# Patient Record
Sex: FEMALE | Race: OTHER | HISPANIC OR LATINO | URBAN - METROPOLITAN AREA
[De-identification: names, ages, dates, MRNs, and addresses within clinical notes are randomized per-mention and may not be internally consistent; named-entity substitution may affect disease eponyms.]

---

## 2017-01-01 ENCOUNTER — INPATIENT (INPATIENT)
Facility: HOSPITAL | Age: 47
LOS: 0 days | DRG: 871 | End: 2017-07-30
Attending: INTERNAL MEDICINE | Admitting: INTERNAL MEDICINE
Payer: SELF-PAY

## 2017-01-01 ENCOUNTER — INPATIENT (INPATIENT)
Facility: HOSPITAL | Age: 47
LOS: 3 days | Discharge: HOME HEALTH | DRG: 178 | End: 2017-06-27
Attending: HOSPITALIST | Admitting: HOSPITALIST
Payer: MEDICAID

## 2017-01-01 VITALS
DIASTOLIC BLOOD PRESSURE: 84 MMHG | TEMPERATURE: 99 F | SYSTOLIC BLOOD PRESSURE: 116 MMHG | RESPIRATION RATE: 18 BRPM | WEIGHT: 169.98 LBS | HEART RATE: 125 BPM | OXYGEN SATURATION: 96 % | HEIGHT: 60 IN

## 2017-01-01 VITALS
DIASTOLIC BLOOD PRESSURE: 80 MMHG | SYSTOLIC BLOOD PRESSURE: 107 MMHG | OXYGEN SATURATION: 100 % | TEMPERATURE: 98 F | HEART RATE: 94 BPM

## 2017-01-01 VITALS — TEMPERATURE: 101 F | WEIGHT: 199.96 LBS | HEART RATE: 170 BPM | RESPIRATION RATE: 36 BRPM | OXYGEN SATURATION: 100 %

## 2017-01-01 VITALS — OXYGEN SATURATION: 74 % | HEART RATE: 87 BPM | RESPIRATION RATE: 27 BRPM

## 2017-01-01 DIAGNOSIS — Z87.19 PERSONAL HISTORY OF OTHER DISEASES OF THE DIGESTIVE SYSTEM: Chronic | ICD-10-CM

## 2017-01-01 DIAGNOSIS — R41.82 ALTERED MENTAL STATUS, UNSPECIFIED: ICD-10-CM

## 2017-01-01 DIAGNOSIS — Z98.89 OTHER SPECIFIED POSTPROCEDURAL STATES: Chronic | ICD-10-CM

## 2017-01-01 DIAGNOSIS — J96.90 RESPIRATORY FAILURE, UNSPECIFIED, UNSPECIFIED WHETHER WITH HYPOXIA OR HYPERCAPNIA: ICD-10-CM

## 2017-01-01 DIAGNOSIS — A41.9 SEPSIS, UNSPECIFIED ORGANISM: ICD-10-CM

## 2017-01-01 DIAGNOSIS — J96.01 ACUTE RESPIRATORY FAILURE WITH HYPOXIA: ICD-10-CM

## 2017-01-01 DIAGNOSIS — Z90.49 ACQUIRED ABSENCE OF OTHER SPECIFIED PARTS OF DIGESTIVE TRACT: Chronic | ICD-10-CM

## 2017-01-01 DIAGNOSIS — J18.9 PNEUMONIA, UNSPECIFIED ORGANISM: ICD-10-CM

## 2017-01-01 DIAGNOSIS — I82.409 ACUTE EMBOLISM AND THROMBOSIS OF UNSPECIFIED DEEP VEINS OF UNSPECIFIED LOWER EXTREMITY: ICD-10-CM

## 2017-01-01 DIAGNOSIS — N17.9 ACUTE KIDNEY FAILURE, UNSPECIFIED: ICD-10-CM

## 2017-01-01 LAB
-  CANDIDA ALBICANS: SIGNIFICANT CHANGE UP
-  CANDIDA GLABRATA: SIGNIFICANT CHANGE UP
-  CANDIDA KRUSEI: SIGNIFICANT CHANGE UP
-  CANDIDA PARAPSILOSIS: SIGNIFICANT CHANGE UP
-  CANDIDA TROPICALIS: SIGNIFICANT CHANGE UP
-  COAGULASE NEGATIVE STAPHYLOCOCCUS: SIGNIFICANT CHANGE UP
-  K. PNEUMONIAE GROUP: SIGNIFICANT CHANGE UP
-  KPC RESISTANCE GENE: SIGNIFICANT CHANGE UP
-  STREPTOCOCCUS SP. (NOT GRP A, B OR S PNEUMONIAE): SIGNIFICANT CHANGE UP
A BAUMANNII DNA SPEC QL NAA+PROBE: SIGNIFICANT CHANGE UP
ALBUMIN SERPL ELPH-MCNC: 1.9 G/DL — LOW (ref 3.3–5.2)
ALBUMIN SERPL ELPH-MCNC: 2.1 G/DL — LOW (ref 3.3–5.2)
ALBUMIN SERPL ELPH-MCNC: 2.2 G/DL — LOW (ref 3.3–5.2)
ALP SERPL-CCNC: 160 U/L — HIGH (ref 40–120)
ALP SERPL-CCNC: 276 U/L — HIGH (ref 40–120)
ALP SERPL-CCNC: 370 U/L — HIGH (ref 40–120)
ALT FLD-CCNC: 29 U/L — SIGNIFICANT CHANGE UP
ALT FLD-CCNC: 62 U/L — HIGH
ALT FLD-CCNC: 99 U/L — HIGH
ANION GAP SERPL CALC-SCNC: 15 MMOL/L — SIGNIFICANT CHANGE UP (ref 5–17)
ANION GAP SERPL CALC-SCNC: 16 MMOL/L — SIGNIFICANT CHANGE UP (ref 5–17)
ANION GAP SERPL CALC-SCNC: 17 MMOL/L — SIGNIFICANT CHANGE UP (ref 5–17)
ANION GAP SERPL CALC-SCNC: 19 MMOL/L — HIGH (ref 5–17)
ANION GAP SERPL CALC-SCNC: 26 MMOL/L — HIGH (ref 5–17)
ANION GAP SERPL CALC-SCNC: 30 MMOL/L — HIGH (ref 5–17)
ANISOCYTOSIS BLD QL: SLIGHT — SIGNIFICANT CHANGE UP
APPEARANCE UR: ABNORMAL
APPEARANCE UR: CLEAR — SIGNIFICANT CHANGE UP
APTT BLD: 29.4 SEC — SIGNIFICANT CHANGE UP (ref 27.5–37.4)
APTT BLD: 34.8 SEC — SIGNIFICANT CHANGE UP (ref 27.5–37.4)
APTT BLD: 42.5 SEC — HIGH (ref 27.5–37.4)
AST SERPL-CCNC: 189 U/L — HIGH
AST SERPL-CCNC: 388 U/L — HIGH
AST SERPL-CCNC: 60 U/L — HIGH
BACTERIA # UR AUTO: ABNORMAL
BACTERIA # UR AUTO: ABNORMAL
BASE EXCESS BLDA CALC-SCNC: -19 MMOL/L — LOW (ref -3–3)
BASOPHILS # BLD AUTO: 0 K/UL — SIGNIFICANT CHANGE UP (ref 0–0.2)
BASOPHILS # BLD AUTO: 0 K/UL — SIGNIFICANT CHANGE UP (ref 0–0.2)
BASOPHILS NFR BLD AUTO: 0 % — SIGNIFICANT CHANGE UP (ref 0–2)
BASOPHILS NFR BLD AUTO: 0 % — SIGNIFICANT CHANGE UP (ref 0–2)
BILIRUB SERPL-MCNC: 0.3 MG/DL — LOW (ref 0.4–2)
BILIRUB SERPL-MCNC: 2.1 MG/DL — HIGH (ref 0.4–2)
BILIRUB SERPL-MCNC: 2.2 MG/DL — HIGH (ref 0.4–2)
BILIRUB UR-MCNC: NEGATIVE — SIGNIFICANT CHANGE UP
BILIRUB UR-MCNC: NEGATIVE — SIGNIFICANT CHANGE UP
BLD GP AB SCN SERPL QL: SIGNIFICANT CHANGE UP
BLOOD GAS COMMENTS ARTERIAL: SIGNIFICANT CHANGE UP
BUN SERPL-MCNC: 10 MG/DL — SIGNIFICANT CHANGE UP (ref 8–20)
BUN SERPL-MCNC: 4 MG/DL — LOW (ref 8–20)
BUN SERPL-MCNC: 6 MG/DL — LOW (ref 8–20)
BUN SERPL-MCNC: 6 MG/DL — LOW (ref 8–20)
BUN SERPL-MCNC: 9 MG/DL — SIGNIFICANT CHANGE UP (ref 8–20)
BUN SERPL-MCNC: 9 MG/DL — SIGNIFICANT CHANGE UP (ref 8–20)
CALCIUM SERPL-MCNC: 6.8 MG/DL — LOW (ref 8.6–10.2)
CALCIUM SERPL-MCNC: 7 MG/DL — LOW (ref 8.6–10.2)
CALCIUM SERPL-MCNC: 7.1 MG/DL — LOW (ref 8.6–10.2)
CALCIUM SERPL-MCNC: 7.1 MG/DL — LOW (ref 8.6–10.2)
CALCIUM SERPL-MCNC: 7.3 MG/DL — LOW (ref 8.6–10.2)
CALCIUM SERPL-MCNC: 7.3 MG/DL — LOW (ref 8.6–10.2)
CHLORIDE SERPL-SCNC: 100 MMOL/L — SIGNIFICANT CHANGE UP (ref 98–107)
CHLORIDE SERPL-SCNC: 100 MMOL/L — SIGNIFICANT CHANGE UP (ref 98–107)
CHLORIDE SERPL-SCNC: 94 MMOL/L — LOW (ref 98–107)
CHLORIDE SERPL-SCNC: 95 MMOL/L — LOW (ref 98–107)
CHLORIDE SERPL-SCNC: 96 MMOL/L — LOW (ref 98–107)
CHLORIDE SERPL-SCNC: 96 MMOL/L — LOW (ref 98–107)
CK SERPL-CCNC: 16 U/L — LOW (ref 25–170)
CK SERPL-CCNC: 18 U/L — LOW (ref 25–170)
CK SERPL-CCNC: 18 U/L — LOW (ref 25–170)
CK SERPL-CCNC: 20 U/L — LOW (ref 25–170)
CO2 SERPL-SCNC: 11 MMOL/L — LOW (ref 22–29)
CO2 SERPL-SCNC: 22 MMOL/L — SIGNIFICANT CHANGE UP (ref 22–29)
CO2 SERPL-SCNC: 23 MMOL/L — SIGNIFICANT CHANGE UP (ref 22–29)
CO2 SERPL-SCNC: 24 MMOL/L — SIGNIFICANT CHANGE UP (ref 22–29)
CO2 SERPL-SCNC: 25 MMOL/L — SIGNIFICANT CHANGE UP (ref 22–29)
CO2 SERPL-SCNC: 9 MMOL/L — CRITICAL LOW (ref 22–29)
COLOR SPEC: YELLOW — SIGNIFICANT CHANGE UP
COLOR SPEC: YELLOW — SIGNIFICANT CHANGE UP
CREAT SERPL-MCNC: 0.49 MG/DL — LOW (ref 0.5–1.3)
CREAT SERPL-MCNC: 0.54 MG/DL — SIGNIFICANT CHANGE UP (ref 0.5–1.3)
CREAT SERPL-MCNC: 0.75 MG/DL — SIGNIFICANT CHANGE UP (ref 0.5–1.3)
CREAT SERPL-MCNC: 1.05 MG/DL — SIGNIFICANT CHANGE UP (ref 0.5–1.3)
CREAT SERPL-MCNC: 1.42 MG/DL — HIGH (ref 0.5–1.3)
CREAT SERPL-MCNC: 1.54 MG/DL — HIGH (ref 0.5–1.3)
CULTURE RESULTS: NO GROWTH — SIGNIFICANT CHANGE UP
CULTURE RESULTS: SIGNIFICANT CHANGE UP
CULTURE RESULTS: SIGNIFICANT CHANGE UP
DACRYOCYTES BLD QL SMEAR: SLIGHT — SIGNIFICANT CHANGE UP
DIFF PNL FLD: ABNORMAL
DIFF PNL FLD: ABNORMAL
E CLOAC COMP DNA BLD POS QL NAA+PROBE: SIGNIFICANT CHANGE UP
E COLI DNA BLD POS QL NAA+NON-PROBE: SIGNIFICANT CHANGE UP
ELLIPTOCYTES BLD QL SMEAR: SLIGHT — SIGNIFICANT CHANGE UP
ENTEROCOC DNA BLD POS QL NAA+NON-PROBE: SIGNIFICANT CHANGE UP
ENTEROCOC DNA BLD POS QL NAA+NON-PROBE: SIGNIFICANT CHANGE UP
EOSINOPHIL # BLD AUTO: 0 K/UL — SIGNIFICANT CHANGE UP (ref 0–0.5)
EOSINOPHIL NFR BLD AUTO: 0 % — SIGNIFICANT CHANGE UP (ref 0–6)
EOSINOPHIL NFR BLD AUTO: 1 % — SIGNIFICANT CHANGE UP (ref 0–5)
EPI CELLS # UR: SIGNIFICANT CHANGE UP
EPI CELLS # UR: SIGNIFICANT CHANGE UP
FERRITIN SERPL-MCNC: 1465 NG/ML — HIGH (ref 11–306)
GAS PNL BLDA: SIGNIFICANT CHANGE UP
GLUCOSE SERPL-MCNC: 114 MG/DL — SIGNIFICANT CHANGE UP (ref 70–115)
GLUCOSE SERPL-MCNC: 177 MG/DL — HIGH (ref 70–115)
GLUCOSE SERPL-MCNC: 61 MG/DL — LOW (ref 70–115)
GLUCOSE SERPL-MCNC: 90 MG/DL — SIGNIFICANT CHANGE UP (ref 70–115)
GLUCOSE SERPL-MCNC: 93 MG/DL — SIGNIFICANT CHANGE UP (ref 70–115)
GLUCOSE SERPL-MCNC: 94 MG/DL — SIGNIFICANT CHANGE UP (ref 70–115)
GLUCOSE UR QL: NEGATIVE MG/DL — SIGNIFICANT CHANGE UP
GLUCOSE UR QL: NEGATIVE MG/DL — SIGNIFICANT CHANGE UP
GP B STREP DNA BLD POS QL NAA+NON-PROBE: SIGNIFICANT CHANGE UP
HAEM INFLU DNA BLD POS QL NAA+NON-PROBE: SIGNIFICANT CHANGE UP
HCO3 BLDA-SCNC: 10 MMOL/L — LOW (ref 20–26)
HCT VFR BLD CALC: 23.6 % — LOW (ref 37–47)
HCT VFR BLD CALC: 24 % — LOW (ref 37–47)
HCT VFR BLD CALC: 25.4 % — LOW (ref 37–47)
HCT VFR BLD CALC: 25.4 % — LOW (ref 37–47)
HCT VFR BLD CALC: 26.9 % — LOW (ref 37–47)
HCT VFR BLD CALC: 27.6 % — LOW (ref 37–47)
HCT VFR BLD CALC: 31.8 % — LOW (ref 37–47)
HGB BLD-MCNC: 10.3 G/DL — LOW (ref 12–16)
HGB BLD-MCNC: 7.4 G/DL — LOW (ref 12–16)
HGB BLD-MCNC: 7.5 G/DL — LOW (ref 12–16)
HGB BLD-MCNC: 8 G/DL — LOW (ref 12–16)
HGB BLD-MCNC: 8.2 G/DL — LOW (ref 12–16)
HGB BLD-MCNC: 8.4 G/DL — LOW (ref 12–16)
HGB BLD-MCNC: 8.8 G/DL — LOW (ref 12–16)
HYPOCHROMIA BLD QL: SLIGHT — SIGNIFICANT CHANGE UP
INR BLD: 1.5 RATIO — HIGH (ref 0.88–1.16)
INR BLD: 2.2 RATIO — HIGH (ref 0.88–1.16)
INR BLD: 2.54 RATIO — HIGH (ref 0.88–1.16)
IRON SATN MFR SERPL: 42 % — SIGNIFICANT CHANGE UP (ref 14–50)
IRON SATN MFR SERPL: 52 UG/DL — SIGNIFICANT CHANGE UP (ref 37–145)
K OXYTOCA DNA BLD POS QL NAA+NON-PROBE: SIGNIFICANT CHANGE UP
KETONES UR-MCNC: NEGATIVE — SIGNIFICANT CHANGE UP
KETONES UR-MCNC: NEGATIVE — SIGNIFICANT CHANGE UP
L MONOCYTOG DNA BLD POS QL NAA+NON-PROBE: SIGNIFICANT CHANGE UP
LACTATE BLDV-MCNC: 11.4 MMOL/L — CRITICAL HIGH (ref 0.5–2)
LACTATE BLDV-MCNC: 12.8 MMOL/L — CRITICAL HIGH (ref 0.5–2)
LACTATE SERPL-SCNC: 1.3 MMOL/L — SIGNIFICANT CHANGE UP (ref 0.5–2)
LEUKOCYTE ESTERASE UR-ACNC: ABNORMAL
LEUKOCYTE ESTERASE UR-ACNC: ABNORMAL
LIDOCAIN IGE QN: 17 U/L — LOW (ref 22–51)
LYMPHOCYTES # BLD AUTO: 0.9 K/UL — LOW (ref 1–4.8)
LYMPHOCYTES # BLD AUTO: 1 % — LOW (ref 20–55)
LYMPHOCYTES # BLD AUTO: 11 % — LOW (ref 20–55)
MACROCYTES BLD QL: SLIGHT — SIGNIFICANT CHANGE UP
MAGNESIUM SERPL-MCNC: 1 MG/DL — CRITICAL LOW (ref 1.6–2.6)
MAGNESIUM SERPL-MCNC: 1.4 MG/DL — LOW (ref 1.6–2.6)
MAGNESIUM SERPL-MCNC: 1.8 MG/DL — SIGNIFICANT CHANGE UP (ref 1.6–2.6)
MAGNESIUM SERPL-MCNC: 1.9 MG/DL — SIGNIFICANT CHANGE UP (ref 1.6–2.6)
MCHC RBC-ENTMCNC: 27.6 PG — SIGNIFICANT CHANGE UP (ref 27–31)
MCHC RBC-ENTMCNC: 27.9 PG — SIGNIFICANT CHANGE UP (ref 27–31)
MCHC RBC-ENTMCNC: 28.1 PG — SIGNIFICANT CHANGE UP (ref 27–31)
MCHC RBC-ENTMCNC: 28.2 PG — SIGNIFICANT CHANGE UP (ref 27–31)
MCHC RBC-ENTMCNC: 28.3 PG — SIGNIFICANT CHANGE UP (ref 27–31)
MCHC RBC-ENTMCNC: 28.4 PG — SIGNIFICANT CHANGE UP (ref 27–31)
MCHC RBC-ENTMCNC: 28.7 PG — SIGNIFICANT CHANGE UP (ref 27–31)
MCHC RBC-ENTMCNC: 31.2 G/DL — LOW (ref 32–36)
MCHC RBC-ENTMCNC: 31.3 G/DL — LOW (ref 32–36)
MCHC RBC-ENTMCNC: 31.4 G/DL — LOW (ref 32–36)
MCHC RBC-ENTMCNC: 31.5 G/DL — LOW (ref 32–36)
MCHC RBC-ENTMCNC: 31.9 G/DL — LOW (ref 32–36)
MCHC RBC-ENTMCNC: 32.3 G/DL — SIGNIFICANT CHANGE UP (ref 32–36)
MCHC RBC-ENTMCNC: 32.4 G/DL — SIGNIFICANT CHANGE UP (ref 32–36)
MCV RBC AUTO: 87.6 FL — SIGNIFICANT CHANGE UP (ref 81–99)
MCV RBC AUTO: 88.2 FL — SIGNIFICANT CHANGE UP (ref 81–99)
MCV RBC AUTO: 88.5 FL — SIGNIFICANT CHANGE UP (ref 81–99)
MCV RBC AUTO: 88.6 FL — SIGNIFICANT CHANGE UP (ref 81–99)
MCV RBC AUTO: 89 FL — SIGNIFICANT CHANGE UP (ref 81–99)
MCV RBC AUTO: 89.7 FL — SIGNIFICANT CHANGE UP (ref 81–99)
MCV RBC AUTO: 90.3 FL — SIGNIFICANT CHANGE UP (ref 81–99)
METAMYELOCYTES # FLD: 1 % — HIGH (ref 0–0)
METAMYELOCYTES # FLD: 1 % — HIGH (ref 0–0)
METAMYELOCYTES # FLD: 2 % — HIGH (ref 0–0)
METHOD TYPE: SIGNIFICANT CHANGE UP
MICROCYTES BLD QL: SLIGHT — SIGNIFICANT CHANGE UP
MONOCYTES # BLD AUTO: 0.7 K/UL — SIGNIFICANT CHANGE UP (ref 0–0.8)
MONOCYTES NFR BLD AUTO: 1 % — LOW (ref 3–10)
MONOCYTES NFR BLD AUTO: 14 % — HIGH (ref 3–10)
MONOCYTES NFR BLD AUTO: 15 % — HIGH (ref 3–10)
MONOCYTES NFR BLD AUTO: 4 % — SIGNIFICANT CHANGE UP (ref 3–10)
MONOCYTES NFR BLD AUTO: 6 % — SIGNIFICANT CHANGE UP (ref 3–10)
MRSA SPEC QL CULT: SIGNIFICANT CHANGE UP
MSSA DNA SPEC QL NAA+PROBE: SIGNIFICANT CHANGE UP
MYELOCYTES NFR BLD: 1 % — HIGH (ref 0–0)
N MEN ISLT CULT: SIGNIFICANT CHANGE UP
NEUTROPHILS # BLD AUTO: 7.6 K/UL — SIGNIFICANT CHANGE UP (ref 1.8–8)
NEUTROPHILS NFR BLD AUTO: 73 % — SIGNIFICANT CHANGE UP (ref 37–73)
NEUTROPHILS NFR BLD AUTO: 74 % — HIGH (ref 37–73)
NEUTROPHILS NFR BLD AUTO: 74 % — HIGH (ref 37–73)
NEUTROPHILS NFR BLD AUTO: 81 % — HIGH (ref 37–73)
NEUTROPHILS NFR BLD AUTO: 83 % — HIGH (ref 37–73)
NEUTS BAND # BLD: 10 % — HIGH (ref 0–8)
NEUTS BAND # BLD: 5 % — SIGNIFICANT CHANGE UP (ref 0–8)
NEUTS BAND # BLD: 9 % — HIGH (ref 0–8)
NITRITE UR-MCNC: NEGATIVE — SIGNIFICANT CHANGE UP
NITRITE UR-MCNC: POSITIVE
NRBC # BLD: 1 /100 — HIGH (ref 0–0)
NT-PROBNP SERPL-SCNC: 175 PG/ML — SIGNIFICANT CHANGE UP (ref 0–300)
OVALOCYTES BLD QL SMEAR: SLIGHT — SIGNIFICANT CHANGE UP
OVALOCYTES BLD QL SMEAR: SLIGHT — SIGNIFICANT CHANGE UP
P AERUGINOSA DNA BLD POS NAA+NON-PROBE: SIGNIFICANT CHANGE UP
PCO2 BLDA: 27 MMHG — LOW (ref 35–45)
PH BLDA: 7.14 — CRITICAL LOW (ref 7.35–7.45)
PH UR: 7 — SIGNIFICANT CHANGE UP (ref 5–8)
PH UR: 8 — SIGNIFICANT CHANGE UP (ref 5–8)
PHOSPHATE SERPL-MCNC: 4.5 MG/DL — SIGNIFICANT CHANGE UP (ref 2.4–4.7)
PHOSPHATE SERPL-MCNC: 5.1 MG/DL — HIGH (ref 2.4–4.7)
PLAT MORPH BLD: NORMAL — SIGNIFICANT CHANGE UP
PLATELET # BLD AUTO: 199 K/UL — SIGNIFICANT CHANGE UP (ref 150–400)
PLATELET # BLD AUTO: 248 K/UL — SIGNIFICANT CHANGE UP (ref 150–400)
PLATELET # BLD AUTO: 338 K/UL — SIGNIFICANT CHANGE UP (ref 150–400)
PLATELET # BLD AUTO: 416 K/UL — HIGH (ref 150–400)
PLATELET # BLD AUTO: 418 K/UL — HIGH (ref 150–400)
PLATELET # BLD AUTO: 432 K/UL — HIGH (ref 150–400)
PLATELET # BLD AUTO: 433 K/UL — HIGH (ref 150–400)
PO2 BLDA: 144 MMHG — HIGH (ref 83–108)
POIKILOCYTOSIS BLD QL AUTO: SLIGHT — SIGNIFICANT CHANGE UP
POLYCHROMASIA BLD QL SMEAR: SLIGHT — SIGNIFICANT CHANGE UP
POTASSIUM SERPL-MCNC: 2.8 MMOL/L — CRITICAL LOW (ref 3.5–5.3)
POTASSIUM SERPL-MCNC: 2.8 MMOL/L — CRITICAL LOW (ref 3.5–5.3)
POTASSIUM SERPL-MCNC: 3.3 MMOL/L — LOW (ref 3.5–5.3)
POTASSIUM SERPL-MCNC: 3.5 MMOL/L — SIGNIFICANT CHANGE UP (ref 3.5–5.3)
POTASSIUM SERPL-MCNC: 3.6 MMOL/L — SIGNIFICANT CHANGE UP (ref 3.5–5.3)
POTASSIUM SERPL-MCNC: 3.8 MMOL/L — SIGNIFICANT CHANGE UP (ref 3.5–5.3)
POTASSIUM SERPL-MCNC: 4.2 MMOL/L — SIGNIFICANT CHANGE UP (ref 3.5–5.3)
POTASSIUM SERPL-SCNC: 2.8 MMOL/L — CRITICAL LOW (ref 3.5–5.3)
POTASSIUM SERPL-SCNC: 2.8 MMOL/L — CRITICAL LOW (ref 3.5–5.3)
POTASSIUM SERPL-SCNC: 3.3 MMOL/L — LOW (ref 3.5–5.3)
POTASSIUM SERPL-SCNC: 3.5 MMOL/L — SIGNIFICANT CHANGE UP (ref 3.5–5.3)
POTASSIUM SERPL-SCNC: 3.6 MMOL/L — SIGNIFICANT CHANGE UP (ref 3.5–5.3)
POTASSIUM SERPL-SCNC: 3.8 MMOL/L — SIGNIFICANT CHANGE UP (ref 3.5–5.3)
POTASSIUM SERPL-SCNC: 4.2 MMOL/L — SIGNIFICANT CHANGE UP (ref 3.5–5.3)
PROT SERPL-MCNC: 5.4 G/DL — LOW (ref 6.6–8.7)
PROT SERPL-MCNC: 5.7 G/DL — LOW (ref 6.6–8.7)
PROT SERPL-MCNC: 6.4 G/DL — LOW (ref 6.6–8.7)
PROT UR-MCNC: 15 MG/DL
PROT UR-MCNC: 500 MG/DL
PROTEUS SP DNA BLD POS QL NAA+NON-PROBE: SIGNIFICANT CHANGE UP
PROTHROM AB SERPL-ACNC: 16.6 SEC — HIGH (ref 9.8–12.7)
PROTHROM AB SERPL-ACNC: 24.6 SEC — HIGH (ref 9.8–12.7)
PROTHROM AB SERPL-ACNC: 28.5 SEC — HIGH (ref 9.8–12.7)
RBC # BLD: 2.63 M/UL — LOW (ref 4.4–5.2)
RBC # BLD: 2.72 M/UL — LOW (ref 4.4–5.2)
RBC # BLD: 2.72 M/UL — LOW (ref 4.4–5.2)
RBC # BLD: 2.87 M/UL — LOW (ref 4.4–5.2)
RBC # BLD: 2.9 M/UL — LOW (ref 4.4–5.2)
RBC # BLD: 2.98 M/UL — LOW (ref 4.4–5.2)
RBC # BLD: 3.1 M/UL — LOW (ref 4.4–5.2)
RBC # BLD: 3.59 M/UL — LOW (ref 4.4–5.2)
RBC # FLD: 18.7 % — HIGH (ref 11–15.6)
RBC # FLD: 19.1 % — HIGH (ref 11–15.6)
RBC # FLD: 19.1 % — HIGH (ref 11–15.6)
RBC # FLD: 19.7 % — HIGH (ref 11–15.6)
RBC # FLD: 20.5 % — HIGH (ref 11–15.6)
RBC # FLD: 20.6 % — HIGH (ref 11–15.6)
RBC # FLD: 21.1 % — HIGH (ref 11–15.6)
RBC BLD AUTO: ABNORMAL
RBC BLD AUTO: PRESENT — SIGNIFICANT CHANGE UP
RBC CASTS # UR COMP ASSIST: >50 /HPF (ref 0–4)
RBC CASTS # UR COMP ASSIST: ABNORMAL /HPF (ref 0–4)
RETICS #: 65 K/UL — SIGNIFICANT CHANGE UP (ref 25–125)
RETICS/RBC NFR: 2.4 % — SIGNIFICANT CHANGE UP (ref 0.5–2.6)
S MARCESCENS DNA BLD POS NAA+NON-PROBE: SIGNIFICANT CHANGE UP
S PNEUM DNA BLD POS QL NAA+NON-PROBE: SIGNIFICANT CHANGE UP
S PYO DNA BLD POS QL NAA+NON-PROBE: SIGNIFICANT CHANGE UP
SAO2 % BLDA: 98 % — SIGNIFICANT CHANGE UP (ref 95–99)
SODIUM SERPL-SCNC: 133 MMOL/L — LOW (ref 135–145)
SODIUM SERPL-SCNC: 135 MMOL/L — SIGNIFICANT CHANGE UP (ref 135–145)
SODIUM SERPL-SCNC: 135 MMOL/L — SIGNIFICANT CHANGE UP (ref 135–145)
SODIUM SERPL-SCNC: 137 MMOL/L — SIGNIFICANT CHANGE UP (ref 135–145)
SODIUM SERPL-SCNC: 137 MMOL/L — SIGNIFICANT CHANGE UP (ref 135–145)
SODIUM SERPL-SCNC: 141 MMOL/L — SIGNIFICANT CHANGE UP (ref 135–145)
SP GR SPEC: 1.01 — SIGNIFICANT CHANGE UP (ref 1.01–1.02)
SP GR SPEC: 1.01 — SIGNIFICANT CHANGE UP (ref 1.01–1.02)
SPECIMEN SOURCE: SIGNIFICANT CHANGE UP
TARGETS BLD QL SMEAR: SLIGHT — SIGNIFICANT CHANGE UP
TIBC SERPL-MCNC: 124 UG/DL — LOW (ref 220–430)
TRANSFERRIN SERPL-MCNC: 87 MG/DL — LOW (ref 192–382)
TROPONIN T SERPL-MCNC: 0.01 NG/ML — SIGNIFICANT CHANGE UP (ref 0–0.06)
TROPONIN T SERPL-MCNC: 0.02 NG/ML — SIGNIFICANT CHANGE UP (ref 0–0.06)
TROPONIN T SERPL-MCNC: <0.01 NG/ML — SIGNIFICANT CHANGE UP (ref 0–0.06)
TROPONIN T SERPL-MCNC: <0.01 NG/ML — SIGNIFICANT CHANGE UP (ref 0–0.06)
TYPE + AB SCN PNL BLD: SIGNIFICANT CHANGE UP
UROBILINOGEN FLD QL: 1 MG/DL
UROBILINOGEN FLD QL: NEGATIVE MG/DL — SIGNIFICANT CHANGE UP
VANCOMYCIN TROUGH SERPL-MCNC: 19 UG/ML — SIGNIFICANT CHANGE UP (ref 10–20)
VANCOMYCIN TROUGH SERPL-MCNC: 27 UG/ML — CRITICAL HIGH (ref 10–20)
VANCOMYCIN TROUGH SERPL-MCNC: 30 UG/ML — CRITICAL HIGH (ref 10–20)
VANCOMYCIN TROUGH SERPL-MCNC: 47.6 UG/ML — CRITICAL HIGH (ref 10–20)
WBC # BLD: 40.7 K/UL — CRITICAL HIGH (ref 4.8–10.8)
WBC # BLD: 6.2 K/UL — SIGNIFICANT CHANGE UP (ref 4.8–10.8)
WBC # BLD: 6.3 K/UL — SIGNIFICANT CHANGE UP (ref 4.8–10.8)
WBC # BLD: 8.5 K/UL — SIGNIFICANT CHANGE UP (ref 4.8–10.8)
WBC # BLD: 8.6 K/UL — SIGNIFICANT CHANGE UP (ref 4.8–10.8)
WBC # BLD: 9.1 K/UL — SIGNIFICANT CHANGE UP (ref 4.8–10.8)
WBC # BLD: 9.8 K/UL — SIGNIFICANT CHANGE UP (ref 4.8–10.8)
WBC # FLD AUTO: 40.7 K/UL — CRITICAL HIGH (ref 4.8–10.8)
WBC # FLD AUTO: 6.2 K/UL — SIGNIFICANT CHANGE UP (ref 4.8–10.8)
WBC # FLD AUTO: 6.3 K/UL — SIGNIFICANT CHANGE UP (ref 4.8–10.8)
WBC # FLD AUTO: 8.5 K/UL — SIGNIFICANT CHANGE UP (ref 4.8–10.8)
WBC # FLD AUTO: 8.6 K/UL — SIGNIFICANT CHANGE UP (ref 4.8–10.8)
WBC # FLD AUTO: 9.1 K/UL — SIGNIFICANT CHANGE UP (ref 4.8–10.8)
WBC # FLD AUTO: 9.8 K/UL — SIGNIFICANT CHANGE UP (ref 4.8–10.8)
WBC UR QL: ABNORMAL
WBC UR QL: SIGNIFICANT CHANGE UP

## 2017-01-01 PROCEDURE — T1013: CPT

## 2017-01-01 PROCEDURE — 87040 BLOOD CULTURE FOR BACTERIA: CPT

## 2017-01-01 PROCEDURE — 99285 EMERGENCY DEPT VISIT HI MDM: CPT

## 2017-01-01 PROCEDURE — 93970 EXTREMITY STUDY: CPT

## 2017-01-01 PROCEDURE — 85610 PROTHROMBIN TIME: CPT

## 2017-01-01 PROCEDURE — 70450 CT HEAD/BRAIN W/O DYE: CPT

## 2017-01-01 PROCEDURE — 87086 URINE CULTURE/COLONY COUNT: CPT

## 2017-01-01 PROCEDURE — 85045 AUTOMATED RETICULOCYTE COUNT: CPT

## 2017-01-01 PROCEDURE — 96375 TX/PRO/DX INJ NEW DRUG ADDON: CPT

## 2017-01-01 PROCEDURE — 99233 SBSQ HOSP IP/OBS HIGH 50: CPT

## 2017-01-01 PROCEDURE — 99291 CRITICAL CARE FIRST HOUR: CPT | Mod: 25

## 2017-01-01 PROCEDURE — 82728 ASSAY OF FERRITIN: CPT

## 2017-01-01 PROCEDURE — 80053 COMPREHEN METABOLIC PANEL: CPT

## 2017-01-01 PROCEDURE — 82550 ASSAY OF CK (CPK): CPT

## 2017-01-01 PROCEDURE — 96374 THER/PROPH/DIAG INJ IV PUSH: CPT

## 2017-01-01 PROCEDURE — 80202 ASSAY OF VANCOMYCIN: CPT

## 2017-01-01 PROCEDURE — 87186 SC STD MICRODIL/AGAR DIL: CPT

## 2017-01-01 PROCEDURE — 36600 WITHDRAWAL OF ARTERIAL BLOOD: CPT

## 2017-01-01 PROCEDURE — 93010 ELECTROCARDIOGRAM REPORT: CPT

## 2017-01-01 PROCEDURE — 87150 DNA/RNA AMPLIFIED PROBE: CPT

## 2017-01-01 PROCEDURE — 93005 ELECTROCARDIOGRAM TRACING: CPT

## 2017-01-01 PROCEDURE — 83605 ASSAY OF LACTIC ACID: CPT

## 2017-01-01 PROCEDURE — 36415 COLL VENOUS BLD VENIPUNCTURE: CPT

## 2017-01-01 PROCEDURE — 93970 EXTREMITY STUDY: CPT | Mod: 26

## 2017-01-01 PROCEDURE — 94640 AIRWAY INHALATION TREATMENT: CPT

## 2017-01-01 PROCEDURE — 84132 ASSAY OF SERUM POTASSIUM: CPT

## 2017-01-01 PROCEDURE — 86900 BLOOD TYPING SEROLOGIC ABO: CPT

## 2017-01-01 PROCEDURE — 99239 HOSP IP/OBS DSCHRG MGMT >30: CPT

## 2017-01-01 PROCEDURE — 82330 ASSAY OF CALCIUM: CPT

## 2017-01-01 PROCEDURE — 85730 THROMBOPLASTIN TIME PARTIAL: CPT

## 2017-01-01 PROCEDURE — 94760 N-INVAS EAR/PLS OXIMETRY 1: CPT

## 2017-01-01 PROCEDURE — 80048 BASIC METABOLIC PNL TOTAL CA: CPT

## 2017-01-01 PROCEDURE — 84484 ASSAY OF TROPONIN QUANT: CPT

## 2017-01-01 PROCEDURE — 71045 X-RAY EXAM CHEST 1 VIEW: CPT

## 2017-01-01 PROCEDURE — 83880 ASSAY OF NATRIURETIC PEPTIDE: CPT

## 2017-01-01 PROCEDURE — 71250 CT THORAX DX C-: CPT | Mod: 26

## 2017-01-01 PROCEDURE — 74176 CT ABD & PELVIS W/O CONTRAST: CPT

## 2017-01-01 PROCEDURE — 94002 VENT MGMT INPAT INIT DAY: CPT

## 2017-01-01 PROCEDURE — 84702 CHORIONIC GONADOTROPIN TEST: CPT

## 2017-01-01 PROCEDURE — 99231 SBSQ HOSP IP/OBS SF/LOW 25: CPT

## 2017-01-01 PROCEDURE — 97163 PT EVAL HIGH COMPLEX 45 MIN: CPT

## 2017-01-01 PROCEDURE — 71250 CT THORAX DX C-: CPT

## 2017-01-01 PROCEDURE — 83690 ASSAY OF LIPASE: CPT

## 2017-01-01 PROCEDURE — 85027 COMPLETE CBC AUTOMATED: CPT

## 2017-01-01 PROCEDURE — 84466 ASSAY OF TRANSFERRIN: CPT

## 2017-01-01 PROCEDURE — 86850 RBC ANTIBODY SCREEN: CPT

## 2017-01-01 PROCEDURE — 83735 ASSAY OF MAGNESIUM: CPT

## 2017-01-01 PROCEDURE — 74176 CT ABD & PELVIS W/O CONTRAST: CPT | Mod: 26

## 2017-01-01 PROCEDURE — 93306 TTE W/DOPPLER COMPLETE: CPT | Mod: 26

## 2017-01-01 PROCEDURE — 81001 URINALYSIS AUTO W/SCOPE: CPT

## 2017-01-01 PROCEDURE — 71010: CPT | Mod: 26

## 2017-01-01 PROCEDURE — 82947 ASSAY GLUCOSE BLOOD QUANT: CPT

## 2017-01-01 PROCEDURE — 86901 BLOOD TYPING SEROLOGIC RH(D): CPT

## 2017-01-01 PROCEDURE — 99292 CRITICAL CARE ADDL 30 MIN: CPT

## 2017-01-01 PROCEDURE — 99291 CRITICAL CARE FIRST HOUR: CPT

## 2017-01-01 PROCEDURE — 85014 HEMATOCRIT: CPT

## 2017-01-01 PROCEDURE — 82803 BLOOD GASES ANY COMBINATION: CPT

## 2017-01-01 PROCEDURE — 99285 EMERGENCY DEPT VISIT HI MDM: CPT | Mod: 25

## 2017-01-01 PROCEDURE — 94003 VENT MGMT INPAT SUBQ DAY: CPT

## 2017-01-01 PROCEDURE — 84100 ASSAY OF PHOSPHORUS: CPT

## 2017-01-01 PROCEDURE — 82435 ASSAY OF BLOOD CHLORIDE: CPT

## 2017-01-01 PROCEDURE — 70450 CT HEAD/BRAIN W/O DYE: CPT | Mod: 26

## 2017-01-01 PROCEDURE — 83550 IRON BINDING TEST: CPT

## 2017-01-01 PROCEDURE — 99223 1ST HOSP IP/OBS HIGH 75: CPT

## 2017-01-01 PROCEDURE — 93306 TTE W/DOPPLER COMPLETE: CPT

## 2017-01-01 PROCEDURE — 84295 ASSAY OF SERUM SODIUM: CPT

## 2017-01-01 RX ORDER — OMEPRAZOLE 10 MG/1
1 CAPSULE, DELAYED RELEASE ORAL
Qty: 0 | Refills: 0 | COMMUNITY

## 2017-01-01 RX ORDER — DEXTROSE 50 % IN WATER 50 %
12.5 SYRINGE (ML) INTRAVENOUS ONCE
Qty: 0 | Refills: 0 | Status: COMPLETED | OUTPATIENT
Start: 2017-01-01 | End: 2017-01-01

## 2017-01-01 RX ORDER — MAGNESIUM SULFATE 500 MG/ML
2 VIAL (ML) INJECTION ONCE
Qty: 0 | Refills: 0 | Status: COMPLETED | OUTPATIENT
Start: 2017-01-01 | End: 2017-01-01

## 2017-01-01 RX ORDER — ALBUTEROL 90 UG/1
1 AEROSOL, METERED ORAL EVERY 4 HOURS
Qty: 0 | Refills: 0 | Status: DISCONTINUED | OUTPATIENT
Start: 2017-01-01 | End: 2017-01-01

## 2017-01-01 RX ORDER — CHLORHEXIDINE GLUCONATE 213 G/1000ML
15 SOLUTION TOPICAL
Qty: 0 | Refills: 0 | Status: DISCONTINUED | OUTPATIENT
Start: 2017-01-01 | End: 2017-01-01

## 2017-01-01 RX ORDER — ACETAMINOPHEN 500 MG
650 TABLET ORAL EVERY 6 HOURS
Qty: 0 | Refills: 0 | Status: DISCONTINUED | OUTPATIENT
Start: 2017-01-01 | End: 2017-01-01

## 2017-01-01 RX ORDER — ONDANSETRON 8 MG/1
4 TABLET, FILM COATED ORAL EVERY 6 HOURS
Qty: 0 | Refills: 0 | Status: DISCONTINUED | OUTPATIENT
Start: 2017-01-01 | End: 2017-01-01

## 2017-01-01 RX ORDER — DEXTROSE 50 % IN WATER 50 %
1 SYRINGE (ML) INTRAVENOUS ONCE
Qty: 0 | Refills: 0 | Status: DISCONTINUED | OUTPATIENT
Start: 2017-01-01 | End: 2017-01-01

## 2017-01-01 RX ORDER — PIPERACILLIN AND TAZOBACTAM 4; .5 G/20ML; G/20ML
3.38 INJECTION, POWDER, LYOPHILIZED, FOR SOLUTION INTRAVENOUS EVERY 8 HOURS
Qty: 0 | Refills: 0 | Status: DISCONTINUED | OUTPATIENT
Start: 2017-01-01 | End: 2017-01-01

## 2017-01-01 RX ORDER — PANTOPRAZOLE SODIUM 20 MG/1
40 TABLET, DELAYED RELEASE ORAL DAILY
Qty: 0 | Refills: 0 | Status: DISCONTINUED | OUTPATIENT
Start: 2017-01-01 | End: 2017-01-01

## 2017-01-01 RX ORDER — TIOTROPIUM BROMIDE 18 UG/1
1 CAPSULE ORAL; RESPIRATORY (INHALATION) DAILY
Qty: 0 | Refills: 0 | Status: DISCONTINUED | OUTPATIENT
Start: 2017-01-01 | End: 2017-01-01

## 2017-01-01 RX ORDER — SODIUM CHLORIDE 9 MG/ML
1000 INJECTION, SOLUTION INTRAVENOUS
Qty: 0 | Refills: 0 | Status: DISCONTINUED | OUTPATIENT
Start: 2017-01-01 | End: 2017-01-01

## 2017-01-01 RX ORDER — NOREPINEPHRINE BITARTRATE/D5W 8 MG/250ML
0.5 PLASTIC BAG, INJECTION (ML) INTRAVENOUS
Qty: 16 | Refills: 0 | Status: DISCONTINUED | OUTPATIENT
Start: 2017-01-01 | End: 2017-01-01

## 2017-01-01 RX ORDER — GABAPENTIN 400 MG/1
1 CAPSULE ORAL
Qty: 0 | Refills: 0 | COMMUNITY

## 2017-01-01 RX ORDER — DEXTROSE 50 % IN WATER 50 %
25 SYRINGE (ML) INTRAVENOUS ONCE
Qty: 0 | Refills: 0 | Status: DISCONTINUED | OUTPATIENT
Start: 2017-01-01 | End: 2017-01-01

## 2017-01-01 RX ORDER — IPRATROPIUM/ALBUTEROL SULFATE 18-103MCG
3 AEROSOL WITH ADAPTER (GRAM) INHALATION EVERY 6 HOURS
Qty: 0 | Refills: 0 | Status: DISCONTINUED | OUTPATIENT
Start: 2017-01-01 | End: 2017-01-01

## 2017-01-01 RX ORDER — FLUCONAZOLE 150 MG/1
100 TABLET ORAL DAILY
Qty: 0 | Refills: 0 | Status: COMPLETED | OUTPATIENT
Start: 2017-01-01 | End: 2017-01-01

## 2017-01-01 RX ORDER — SPIRONOLACTONE 25 MG/1
50 TABLET, FILM COATED ORAL DAILY
Qty: 0 | Refills: 0 | Status: DISCONTINUED | OUTPATIENT
Start: 2017-01-01 | End: 2017-01-01

## 2017-01-01 RX ORDER — PIPERACILLIN AND TAZOBACTAM 4; .5 G/20ML; G/20ML
3.38 INJECTION, POWDER, LYOPHILIZED, FOR SOLUTION INTRAVENOUS ONCE
Qty: 0 | Refills: 0 | Status: COMPLETED | OUTPATIENT
Start: 2017-01-01 | End: 2017-01-01

## 2017-01-01 RX ORDER — THIAMINE MONONITRATE (VIT B1) 100 MG
200 TABLET ORAL EVERY 12 HOURS
Qty: 0 | Refills: 0 | Status: DISCONTINUED | OUTPATIENT
Start: 2017-01-01 | End: 2017-01-01

## 2017-01-01 RX ORDER — PANTOPRAZOLE SODIUM 20 MG/1
40 TABLET, DELAYED RELEASE ORAL
Qty: 0 | Refills: 0 | Status: DISCONTINUED | OUTPATIENT
Start: 2017-01-01 | End: 2017-01-01

## 2017-01-01 RX ORDER — SODIUM BICARBONATE 1 MEQ/ML
150 SYRINGE (ML) INTRAVENOUS ONCE
Qty: 0 | Refills: 0 | Status: COMPLETED | OUTPATIENT
Start: 2017-01-01 | End: 2017-01-01

## 2017-01-01 RX ORDER — POTASSIUM CHLORIDE 20 MEQ
40 PACKET (EA) ORAL ONCE
Qty: 0 | Refills: 0 | Status: DISCONTINUED | OUTPATIENT
Start: 2017-01-01 | End: 2017-01-01

## 2017-01-01 RX ORDER — RIVAROXABAN 15 MG-20MG
20 KIT ORAL DAILY
Qty: 0 | Refills: 0 | Status: DISCONTINUED | OUTPATIENT
Start: 2017-01-01 | End: 2017-01-01

## 2017-01-01 RX ORDER — POTASSIUM CHLORIDE 20 MEQ
10 PACKET (EA) ORAL ONCE
Qty: 0 | Refills: 0 | Status: DISCONTINUED | OUTPATIENT
Start: 2017-01-01 | End: 2017-01-01

## 2017-01-01 RX ORDER — VANCOMYCIN HCL 1 G
1000 VIAL (EA) INTRAVENOUS EVERY 12 HOURS
Qty: 0 | Refills: 0 | Status: DISCONTINUED | OUTPATIENT
Start: 2017-01-01 | End: 2017-01-01

## 2017-01-01 RX ORDER — POTASSIUM CHLORIDE 20 MEQ
10 PACKET (EA) ORAL
Qty: 0 | Refills: 0 | Status: DISCONTINUED | OUTPATIENT
Start: 2017-01-01 | End: 2017-01-01

## 2017-01-01 RX ORDER — DILTIAZEM HCL 120 MG
1 CAPSULE, EXT RELEASE 24 HR ORAL
Qty: 0 | Refills: 0 | COMMUNITY

## 2017-01-01 RX ORDER — HYDROCORTISONE 20 MG
100 TABLET ORAL ONCE
Qty: 0 | Refills: 0 | Status: COMPLETED | OUTPATIENT
Start: 2017-01-01 | End: 2017-01-01

## 2017-01-01 RX ORDER — HEPARIN SODIUM 5000 [USP'U]/ML
5000 INJECTION INTRAVENOUS; SUBCUTANEOUS EVERY 8 HOURS
Qty: 0 | Refills: 0 | Status: DISCONTINUED | OUTPATIENT
Start: 2017-01-01 | End: 2017-01-01

## 2017-01-01 RX ORDER — IPRATROPIUM/ALBUTEROL SULFATE 18-103MCG
3 AEROSOL WITH ADAPTER (GRAM) INHALATION ONCE
Qty: 0 | Refills: 0 | Status: DISCONTINUED | OUTPATIENT
Start: 2017-01-01 | End: 2017-01-01

## 2017-01-01 RX ORDER — POTASSIUM CHLORIDE 20 MEQ
10 PACKET (EA) ORAL ONCE
Qty: 0 | Refills: 0 | Status: COMPLETED | OUTPATIENT
Start: 2017-01-01 | End: 2017-01-01

## 2017-01-01 RX ORDER — SODIUM CHLORIDE 9 MG/ML
2500 INJECTION INTRAMUSCULAR; INTRAVENOUS; SUBCUTANEOUS ONCE
Qty: 0 | Refills: 0 | Status: COMPLETED | OUTPATIENT
Start: 2017-01-01 | End: 2017-01-01

## 2017-01-01 RX ORDER — FONDAPARINUX SODIUM 2.5 MG/.5ML
1 INJECTION, SOLUTION SUBCUTANEOUS
Qty: 0 | Refills: 0 | COMMUNITY

## 2017-01-01 RX ORDER — SPIRONOLACTONE 25 MG/1
1 TABLET, FILM COATED ORAL
Qty: 0 | Refills: 0 | COMMUNITY

## 2017-01-01 RX ORDER — GLUCAGON INJECTION, SOLUTION 0.5 MG/.1ML
1 INJECTION, SOLUTION SUBCUTANEOUS ONCE
Qty: 0 | Refills: 0 | Status: DISCONTINUED | OUTPATIENT
Start: 2017-01-01 | End: 2017-01-01

## 2017-01-01 RX ORDER — VASOPRESSIN 20 [USP'U]/ML
0.04 INJECTION INTRAVENOUS
Qty: 100 | Refills: 0 | Status: DISCONTINUED | OUTPATIENT
Start: 2017-01-01 | End: 2017-01-01

## 2017-01-01 RX ORDER — MAGNESIUM OXIDE 400 MG ORAL TABLET 241.3 MG
400 TABLET ORAL
Qty: 0 | Refills: 0 | Status: DISCONTINUED | OUTPATIENT
Start: 2017-01-01 | End: 2017-01-01

## 2017-01-01 RX ORDER — FUROSEMIDE 40 MG
40 TABLET ORAL DAILY
Qty: 0 | Refills: 0 | Status: DISCONTINUED | OUTPATIENT
Start: 2017-01-01 | End: 2017-01-01

## 2017-01-01 RX ORDER — VANCOMYCIN HCL 1 G
1000 VIAL (EA) INTRAVENOUS ONCE
Qty: 0 | Refills: 0 | Status: COMPLETED | OUTPATIENT
Start: 2017-01-01 | End: 2017-01-01

## 2017-01-01 RX ORDER — ACETAMINOPHEN 500 MG
650 TABLET ORAL ONCE
Qty: 0 | Refills: 0 | Status: COMPLETED | OUTPATIENT
Start: 2017-01-01 | End: 2017-01-01

## 2017-01-01 RX ORDER — DEXTROSE 50 % IN WATER 50 %
12.5 SYRINGE (ML) INTRAVENOUS ONCE
Qty: 0 | Refills: 0 | Status: DISCONTINUED | OUTPATIENT
Start: 2017-01-01 | End: 2017-01-01

## 2017-01-01 RX ORDER — FUROSEMIDE 40 MG
20 TABLET ORAL DAILY
Qty: 0 | Refills: 0 | Status: DISCONTINUED | OUTPATIENT
Start: 2017-01-01 | End: 2017-01-01

## 2017-01-01 RX ORDER — SODIUM CHLORIDE 9 MG/ML
3 INJECTION INTRAMUSCULAR; INTRAVENOUS; SUBCUTANEOUS ONCE
Qty: 0 | Refills: 0 | Status: COMPLETED | OUTPATIENT
Start: 2017-01-01 | End: 2017-01-01

## 2017-01-01 RX ORDER — GABAPENTIN 400 MG/1
300 CAPSULE ORAL
Qty: 0 | Refills: 0 | Status: DISCONTINUED | OUTPATIENT
Start: 2017-01-01 | End: 2017-01-01

## 2017-01-01 RX ORDER — HYDROCORTISONE 20 MG
100 TABLET ORAL EVERY 8 HOURS
Qty: 0 | Refills: 0 | Status: DISCONTINUED | OUTPATIENT
Start: 2017-01-01 | End: 2017-01-01

## 2017-01-01 RX ORDER — SODIUM BICARBONATE 1 MEQ/ML
0.07 SYRINGE (ML) INTRAVENOUS
Qty: 150 | Refills: 0 | Status: DISCONTINUED | OUTPATIENT
Start: 2017-01-01 | End: 2017-01-01

## 2017-01-01 RX ORDER — PROPOFOL 10 MG/ML
5 INJECTION, EMULSION INTRAVENOUS
Qty: 500 | Refills: 0 | Status: DISCONTINUED | OUTPATIENT
Start: 2017-01-01 | End: 2017-01-01

## 2017-01-01 RX ORDER — SODIUM CHLORIDE 9 MG/ML
1000 INJECTION INTRAMUSCULAR; INTRAVENOUS; SUBCUTANEOUS ONCE
Qty: 0 | Refills: 0 | Status: COMPLETED | OUTPATIENT
Start: 2017-01-01 | End: 2017-01-01

## 2017-01-01 RX ORDER — CALCIUM GLUCONATE 100 MG/ML
2 VIAL (ML) INTRAVENOUS ONCE
Qty: 2 | Refills: 0 | Status: COMPLETED | OUTPATIENT
Start: 2017-01-01 | End: 2017-01-01

## 2017-01-01 RX ORDER — FLUCONAZOLE 150 MG/1
1 TABLET ORAL
Qty: 0 | Refills: 0 | COMMUNITY

## 2017-01-01 RX ORDER — SODIUM CHLORIDE 9 MG/ML
2000 INJECTION INTRAMUSCULAR; INTRAVENOUS; SUBCUTANEOUS ONCE
Qty: 0 | Refills: 0 | Status: COMPLETED | OUTPATIENT
Start: 2017-01-01 | End: 2017-01-01

## 2017-01-01 RX ORDER — POTASSIUM CHLORIDE 20 MEQ
40 PACKET (EA) ORAL ONCE
Qty: 0 | Refills: 0 | Status: COMPLETED | OUTPATIENT
Start: 2017-01-01 | End: 2017-01-01

## 2017-01-01 RX ORDER — HYDROMORPHONE HYDROCHLORIDE 2 MG/ML
0.3 INJECTION INTRAMUSCULAR; INTRAVENOUS; SUBCUTANEOUS ONCE
Qty: 0 | Refills: 0 | Status: DISCONTINUED | OUTPATIENT
Start: 2017-01-01 | End: 2017-01-01

## 2017-01-01 RX ORDER — SODIUM CHLORIDE 9 MG/ML
2000 INJECTION INTRAMUSCULAR; INTRAVENOUS; SUBCUTANEOUS ONCE
Qty: 0 | Refills: 0 | Status: DISCONTINUED | OUTPATIENT
Start: 2017-01-01 | End: 2017-01-01

## 2017-01-01 RX ORDER — POTASSIUM CHLORIDE 20 MEQ
20 PACKET (EA) ORAL
Qty: 0 | Refills: 0 | Status: COMPLETED | OUTPATIENT
Start: 2017-01-01 | End: 2017-01-01

## 2017-01-01 RX ORDER — NOREPINEPHRINE BITARTRATE/D5W 8 MG/250ML
0.05 PLASTIC BAG, INJECTION (ML) INTRAVENOUS
Qty: 8 | Refills: 0 | Status: DISCONTINUED | OUTPATIENT
Start: 2017-01-01 | End: 2017-01-01

## 2017-01-01 RX ORDER — SODIUM CHLORIDE 9 MG/ML
500 INJECTION INTRAMUSCULAR; INTRAVENOUS; SUBCUTANEOUS ONCE
Qty: 0 | Refills: 0 | Status: COMPLETED | OUTPATIENT
Start: 2017-01-01 | End: 2017-01-01

## 2017-01-01 RX ORDER — ACETAMINOPHEN 500 MG
975 TABLET ORAL ONCE
Qty: 0 | Refills: 0 | Status: DISCONTINUED | OUTPATIENT
Start: 2017-01-01 | End: 2017-01-01

## 2017-01-01 RX ORDER — LACTOBACILLUS ACIDOPHILUS 100MM CELL
1 CAPSULE ORAL
Qty: 0 | Refills: 0 | Status: DISCONTINUED | OUTPATIENT
Start: 2017-01-01 | End: 2017-01-01

## 2017-01-01 RX ORDER — HYDROCORTISONE 20 MG
50 TABLET ORAL EVERY 6 HOURS
Qty: 0 | Refills: 0 | Status: DISCONTINUED | OUTPATIENT
Start: 2017-01-01 | End: 2017-01-01

## 2017-01-01 RX ORDER — VANCOMYCIN HCL 1 G
1000 VIAL (EA) INTRAVENOUS EVERY 8 HOURS
Qty: 0 | Refills: 0 | Status: DISCONTINUED | OUTPATIENT
Start: 2017-01-01 | End: 2017-01-01

## 2017-01-01 RX ORDER — SENNA PLUS 8.6 MG/1
2 TABLET ORAL
Qty: 0 | Refills: 0 | COMMUNITY

## 2017-01-01 RX ORDER — PROPOFOL 10 MG/ML
5 INJECTION, EMULSION INTRAVENOUS
Qty: 1000 | Refills: 0 | Status: DISCONTINUED | OUTPATIENT
Start: 2017-01-01 | End: 2017-01-01

## 2017-01-01 RX ORDER — ACETAMINOPHEN 500 MG
975 TABLET ORAL ONCE
Qty: 0 | Refills: 0 | Status: COMPLETED | OUTPATIENT
Start: 2017-01-01 | End: 2017-01-01

## 2017-01-01 RX ADMIN — FLUCONAZOLE 100 MILLIGRAM(S): 150 TABLET ORAL at 08:46

## 2017-01-01 RX ADMIN — Medication 8.5 MICROGRAM(S)/KG/MIN: at 19:34

## 2017-01-01 RX ADMIN — Medication 1 TABLET(S): at 17:03

## 2017-01-01 RX ADMIN — GABAPENTIN 300 MILLIGRAM(S): 400 CAPSULE ORAL at 17:09

## 2017-01-01 RX ADMIN — Medication 3 MILLILITER(S): at 16:12

## 2017-01-01 RX ADMIN — Medication 250 MILLIGRAM(S): at 13:00

## 2017-01-01 RX ADMIN — Medication 1 TABLET(S): at 17:10

## 2017-01-01 RX ADMIN — MAGNESIUM OXIDE 400 MG ORAL TABLET 400 MILLIGRAM(S): 241.3 TABLET ORAL at 09:36

## 2017-01-01 RX ADMIN — Medication 75 MEQ/KG/HR: at 02:27

## 2017-01-01 RX ADMIN — SODIUM CHLORIDE 3 MILLILITER(S): 9 INJECTION INTRAMUSCULAR; INTRAVENOUS; SUBCUTANEOUS at 16:40

## 2017-01-01 RX ADMIN — Medication 1 TABLET(S): at 08:46

## 2017-01-01 RX ADMIN — Medication 37.73 MICROGRAM(S)/KG/MIN: at 08:28

## 2017-01-01 RX ADMIN — Medication 650 MILLIGRAM(S): at 20:00

## 2017-01-01 RX ADMIN — RIVAROXABAN 20 MILLIGRAM(S): KIT at 13:13

## 2017-01-01 RX ADMIN — PIPERACILLIN AND TAZOBACTAM 25 GRAM(S): 4; .5 INJECTION, POWDER, LYOPHILIZED, FOR SOLUTION INTRAVENOUS at 14:12

## 2017-01-01 RX ADMIN — Medication 8.5 MICROGRAM(S)/KG/MIN: at 04:49

## 2017-01-01 RX ADMIN — Medication 650 MILLIGRAM(S): at 22:09

## 2017-01-01 RX ADMIN — Medication 1 TABLET(S): at 12:53

## 2017-01-01 RX ADMIN — Medication 1 TABLET(S): at 09:33

## 2017-01-01 RX ADMIN — MAGNESIUM OXIDE 400 MG ORAL TABLET 400 MILLIGRAM(S): 241.3 TABLET ORAL at 17:02

## 2017-01-01 RX ADMIN — SODIUM CHLORIDE 2500 MILLILITER(S): 9 INJECTION INTRAMUSCULAR; INTRAVENOUS; SUBCUTANEOUS at 21:24

## 2017-01-01 RX ADMIN — Medication 40 MILLIEQUIVALENT(S): at 04:48

## 2017-01-01 RX ADMIN — PIPERACILLIN AND TAZOBACTAM 25 GRAM(S): 4; .5 INJECTION, POWDER, LYOPHILIZED, FOR SOLUTION INTRAVENOUS at 06:19

## 2017-01-01 RX ADMIN — PIPERACILLIN AND TAZOBACTAM 200 GRAM(S): 4; .5 INJECTION, POWDER, LYOPHILIZED, FOR SOLUTION INTRAVENOUS at 19:15

## 2017-01-01 RX ADMIN — MAGNESIUM OXIDE 400 MG ORAL TABLET 400 MILLIGRAM(S): 241.3 TABLET ORAL at 12:53

## 2017-01-01 RX ADMIN — Medication 8.5 MICROGRAM(S)/KG/MIN: at 01:42

## 2017-01-01 RX ADMIN — SODIUM CHLORIDE 1000 MILLILITER(S): 9 INJECTION INTRAMUSCULAR; INTRAVENOUS; SUBCUTANEOUS at 19:38

## 2017-01-01 RX ADMIN — Medication 40 MILLIGRAM(S): at 06:14

## 2017-01-01 RX ADMIN — Medication 1 TABLET(S): at 18:09

## 2017-01-01 RX ADMIN — HYDROMORPHONE HYDROCHLORIDE 0.3 MILLIGRAM(S): 2 INJECTION INTRAMUSCULAR; INTRAVENOUS; SUBCUTANEOUS at 10:30

## 2017-01-01 RX ADMIN — PIPERACILLIN AND TAZOBACTAM 25 GRAM(S): 4; .5 INJECTION, POWDER, LYOPHILIZED, FOR SOLUTION INTRAVENOUS at 05:26

## 2017-01-01 RX ADMIN — Medication 1 TABLET(S): at 11:57

## 2017-01-01 RX ADMIN — Medication 40 MILLIGRAM(S): at 05:27

## 2017-01-01 RX ADMIN — PANTOPRAZOLE SODIUM 40 MILLIGRAM(S): 20 TABLET, DELAYED RELEASE ORAL at 09:33

## 2017-01-01 RX ADMIN — Medication 100 MILLIGRAM(S): at 06:30

## 2017-01-01 RX ADMIN — HEPARIN SODIUM 5000 UNIT(S): 5000 INJECTION INTRAVENOUS; SUBCUTANEOUS at 00:00

## 2017-01-01 RX ADMIN — GABAPENTIN 300 MILLIGRAM(S): 400 CAPSULE ORAL at 05:27

## 2017-01-01 RX ADMIN — CHLORHEXIDINE GLUCONATE 15 MILLILITER(S): 213 SOLUTION TOPICAL at 06:01

## 2017-01-01 RX ADMIN — PIPERACILLIN AND TAZOBACTAM 25 GRAM(S): 4; .5 INJECTION, POWDER, LYOPHILIZED, FOR SOLUTION INTRAVENOUS at 07:14

## 2017-01-01 RX ADMIN — Medication 20 MILLIEQUIVALENT(S): at 10:14

## 2017-01-01 RX ADMIN — SODIUM CHLORIDE 100 MILLILITER(S): 9 INJECTION, SOLUTION INTRAVENOUS at 02:28

## 2017-01-01 RX ADMIN — MAGNESIUM OXIDE 400 MG ORAL TABLET 400 MILLIGRAM(S): 241.3 TABLET ORAL at 11:57

## 2017-01-01 RX ADMIN — RIVAROXABAN 20 MILLIGRAM(S): KIT at 12:53

## 2017-01-01 RX ADMIN — PIPERACILLIN AND TAZOBACTAM 25 GRAM(S): 4; .5 INJECTION, POWDER, LYOPHILIZED, FOR SOLUTION INTRAVENOUS at 15:14

## 2017-01-01 RX ADMIN — GABAPENTIN 300 MILLIGRAM(S): 400 CAPSULE ORAL at 17:02

## 2017-01-01 RX ADMIN — Medication 100 MILLIGRAM(S): at 02:27

## 2017-01-01 RX ADMIN — Medication 40 MEQ/KG/HR: at 09:00

## 2017-01-01 RX ADMIN — Medication 250 MILLIGRAM(S): at 21:09

## 2017-01-01 RX ADMIN — SPIRONOLACTONE 50 MILLIGRAM(S): 25 TABLET, FILM COATED ORAL at 06:14

## 2017-01-01 RX ADMIN — Medication 3 MILLILITER(S): at 08:54

## 2017-01-01 RX ADMIN — MAGNESIUM OXIDE 400 MG ORAL TABLET 400 MILLIGRAM(S): 241.3 TABLET ORAL at 12:06

## 2017-01-01 RX ADMIN — FLUCONAZOLE 100 MILLIGRAM(S): 150 TABLET ORAL at 13:13

## 2017-01-01 RX ADMIN — Medication 40 MILLIGRAM(S): at 06:19

## 2017-01-01 RX ADMIN — Medication 3 MILLILITER(S): at 14:47

## 2017-01-01 RX ADMIN — PIPERACILLIN AND TAZOBACTAM 25 GRAM(S): 4; .5 INJECTION, POWDER, LYOPHILIZED, FOR SOLUTION INTRAVENOUS at 06:02

## 2017-01-01 RX ADMIN — Medication 1 TABLET(S): at 08:49

## 2017-01-01 RX ADMIN — Medication 3 MILLILITER(S): at 21:26

## 2017-01-01 RX ADMIN — SODIUM CHLORIDE 1000 MILLILITER(S): 9 INJECTION INTRAMUSCULAR; INTRAVENOUS; SUBCUTANEOUS at 19:34

## 2017-01-01 RX ADMIN — Medication 3 MILLILITER(S): at 08:21

## 2017-01-01 RX ADMIN — MAGNESIUM OXIDE 400 MG ORAL TABLET 400 MILLIGRAM(S): 241.3 TABLET ORAL at 08:45

## 2017-01-01 RX ADMIN — PANTOPRAZOLE SODIUM 40 MILLIGRAM(S): 20 TABLET, DELAYED RELEASE ORAL at 05:57

## 2017-01-01 RX ADMIN — PANTOPRAZOLE SODIUM 40 MILLIGRAM(S): 20 TABLET, DELAYED RELEASE ORAL at 06:14

## 2017-01-01 RX ADMIN — Medication 3 MILLILITER(S): at 21:27

## 2017-01-01 RX ADMIN — RIVAROXABAN 20 MILLIGRAM(S): KIT at 11:57

## 2017-01-01 RX ADMIN — Medication 3 MILLILITER(S): at 03:14

## 2017-01-01 RX ADMIN — PIPERACILLIN AND TAZOBACTAM 25 GRAM(S): 4; .5 INJECTION, POWDER, LYOPHILIZED, FOR SOLUTION INTRAVENOUS at 23:40

## 2017-01-01 RX ADMIN — Medication 3 MILLILITER(S): at 03:16

## 2017-01-01 RX ADMIN — Medication 50 MILLIEQUIVALENT(S): at 04:50

## 2017-01-01 RX ADMIN — Medication 12.5 GRAM(S): at 03:00

## 2017-01-01 RX ADMIN — GABAPENTIN 300 MILLIGRAM(S): 400 CAPSULE ORAL at 06:14

## 2017-01-01 RX ADMIN — Medication 100 MILLIEQUIVALENT(S): at 00:46

## 2017-01-01 RX ADMIN — Medication 3 MILLILITER(S): at 10:17

## 2017-01-01 RX ADMIN — PIPERACILLIN AND TAZOBACTAM 200 GRAM(S): 4; .5 INJECTION, POWDER, LYOPHILIZED, FOR SOLUTION INTRAVENOUS at 19:39

## 2017-01-01 RX ADMIN — Medication 102 MILLIGRAM(S): at 06:01

## 2017-01-01 RX ADMIN — HYDROMORPHONE HYDROCHLORIDE 0.3 MILLIGRAM(S): 2 INJECTION INTRAMUSCULAR; INTRAVENOUS; SUBCUTANEOUS at 10:15

## 2017-01-01 RX ADMIN — Medication 250 MILLIGRAM(S): at 06:07

## 2017-01-01 RX ADMIN — Medication 650 MILLIGRAM(S): at 03:53

## 2017-01-01 RX ADMIN — Medication 1 TABLET(S): at 13:14

## 2017-01-01 RX ADMIN — Medication 20 MILLIEQUIVALENT(S): at 14:14

## 2017-01-01 RX ADMIN — PIPERACILLIN AND TAZOBACTAM 25 GRAM(S): 4; .5 INJECTION, POWDER, LYOPHILIZED, FOR SOLUTION INTRAVENOUS at 23:42

## 2017-01-01 RX ADMIN — PIPERACILLIN AND TAZOBACTAM 25 GRAM(S): 4; .5 INJECTION, POWDER, LYOPHILIZED, FOR SOLUTION INTRAVENOUS at 14:19

## 2017-01-01 RX ADMIN — Medication 1 TABLET(S): at 08:47

## 2017-01-01 RX ADMIN — RIVAROXABAN 20 MILLIGRAM(S): KIT at 08:47

## 2017-01-01 RX ADMIN — Medication 250 MILLIGRAM(S): at 02:27

## 2017-01-01 RX ADMIN — GABAPENTIN 300 MILLIGRAM(S): 400 CAPSULE ORAL at 06:19

## 2017-01-01 RX ADMIN — GABAPENTIN 300 MILLIGRAM(S): 400 CAPSULE ORAL at 05:57

## 2017-01-01 RX ADMIN — Medication 650 MILLIGRAM(S): at 03:23

## 2017-01-01 RX ADMIN — SPIRONOLACTONE 50 MILLIGRAM(S): 25 TABLET, FILM COATED ORAL at 06:19

## 2017-01-01 RX ADMIN — Medication 975 MILLIGRAM(S): at 20:45

## 2017-01-01 RX ADMIN — Medication 250 MILLIGRAM(S): at 11:57

## 2017-01-01 RX ADMIN — Medication 200 GRAM(S): at 00:46

## 2017-01-01 RX ADMIN — PANTOPRAZOLE SODIUM 40 MILLIGRAM(S): 20 TABLET, DELAYED RELEASE ORAL at 06:19

## 2017-01-01 RX ADMIN — SPIRONOLACTONE 50 MILLIGRAM(S): 25 TABLET, FILM COATED ORAL at 05:27

## 2017-01-01 RX ADMIN — Medication 3 MILLILITER(S): at 15:09

## 2017-01-01 RX ADMIN — VASOPRESSIN 2.4 UNIT(S)/MIN: 20 INJECTION INTRAVENOUS at 00:15

## 2017-01-01 RX ADMIN — PIPERACILLIN AND TAZOBACTAM 25 GRAM(S): 4; .5 INJECTION, POWDER, LYOPHILIZED, FOR SOLUTION INTRAVENOUS at 21:16

## 2017-01-01 RX ADMIN — Medication 150 MILLIEQUIVALENT(S): at 23:40

## 2017-01-01 RX ADMIN — PIPERACILLIN AND TAZOBACTAM 25 GRAM(S): 4; .5 INJECTION, POWDER, LYOPHILIZED, FOR SOLUTION INTRAVENOUS at 05:57

## 2017-01-01 RX ADMIN — MAGNESIUM OXIDE 400 MG ORAL TABLET 400 MILLIGRAM(S): 241.3 TABLET ORAL at 08:49

## 2017-01-01 RX ADMIN — GABAPENTIN 300 MILLIGRAM(S): 400 CAPSULE ORAL at 18:10

## 2017-01-01 RX ADMIN — SODIUM CHLORIDE 1000 MILLILITER(S): 9 INJECTION INTRAMUSCULAR; INTRAVENOUS; SUBCUTANEOUS at 06:03

## 2017-01-01 RX ADMIN — Medication 50 MILLIEQUIVALENT(S): at 06:01

## 2017-01-01 RX ADMIN — Medication 50 GRAM(S): at 08:47

## 2017-01-01 RX ADMIN — Medication 20 MILLIEQUIVALENT(S): at 15:12

## 2017-01-01 RX ADMIN — Medication 50 GRAM(S): at 00:46

## 2017-01-01 RX ADMIN — HEPARIN SODIUM 5000 UNIT(S): 5000 INJECTION INTRAVENOUS; SUBCUTANEOUS at 08:30

## 2017-01-01 RX ADMIN — MAGNESIUM OXIDE 400 MG ORAL TABLET 400 MILLIGRAM(S): 241.3 TABLET ORAL at 18:09

## 2017-01-01 RX ADMIN — Medication 250 MILLIGRAM(S): at 23:32

## 2017-06-23 NOTE — ED PROVIDER NOTE - OBJECTIVE STATEMENT
The patient is a 47 year old female presents to ED complaining of SOB, ALMAR and mild CP.  The patient was recently admitted and discharged from [facility] for right leg infection

## 2017-06-23 NOTE — ED PROVIDER NOTE - CHPI ED SYMPTOMS POS
COUGH/DYSPNEA ON EXERTION/CHILLS/CHEST CONGESTION/DIFFICULTY BREATHING/SHORTNESS OF BREATH/CHEST PAIN

## 2017-06-23 NOTE — H&P ADULT - HISTORY OF PRESENT ILLNESS
46 y/o female who was at [facility] for about 15 days and was discharged 2 days ago and today was having dry cough more than her baseline, difficulty breathing and felt warm last night as per  at bedside. 46 y/o female who was admitted at [facility] for about 15 days  for multiple complaints and was discharged 2 days ago and today was having dry cough more than her baseline, difficulty breathing and felt warm last night as per  at bedside. About 1 week before this last admission she was admitted at [facility] as well and during that admission she was diagnosed with LLE dvt and has been on xeralto for past 21 + days. pt. used be on hospice for her h/o cervical cancer. For cervical cancer she got chemo and radiation in the past and has followed with dr. ruelas.  During her last admission at [facility] she got b/l nephrostomy tubes as well. no abd. pain. no n/v/d. pt. has rt. lower ext. wounds which are somewhat non healing for past 5 months. There has been no sick contact at home.  At the time of admission pt.'s breathing status is stable. no cp. no dizziness reported.

## 2017-06-23 NOTE — H&P ADULT - ASSESSMENT
pneumonia, LLL, will keep on vanco/ zosyn. blood , sputum cx.     Dyspnea. likely  combination of deconditioning, pneumonia and left . pl. effusion, will get ct chest for better evaluation, lasix 40 mg po added. will get echo. LN in lungs, likely metastatic. duoneb.     DVT, LLE per history, will get venous leg doppler b/l. will continue with xeralto as 20 mg daily.    Rt. lower ext. wound , will request for wound care consult. pneumonia, LLL, will keep on vanco/ zosyn. blood , sputum cx.     Dyspnea. likely  combination of deconditioning, pneumonia and left . pl. effusion, will get ct chest for better evaluation, lasix 40 mg po added. will get echo. LN in lungs, likely metastatic. duoneb.     DVT, LLE per history,  acute ? will get venous leg doppler b/l. will continue with xeralto as 20 mg daily.    Rt. lower ext. wound , will request for wound care consult. pneumonia, LLL, will keep on vanco/ zosyn. blood , sputum cx.     Dyspnea. likely  combination of deconditioning, pneumonia and left . pl. effusion, will get ct chest for better evaluation, lasix 40 mg po added. will get echo. LN in lungs, likely metastatic. duoneb.     DVT, LLE per history,  acute ? will get venous leg doppler b/l. will continue with xeralto as 20 mg daily.    Rt. lower ext. wound , will request for wound care consult.     anemia, likely of chronic disease. anemia work up ordered. follow am labs.

## 2017-06-23 NOTE — ED PROVIDER NOTE - MEDICAL DECISION MAKING DETAILS
The patient was recently hospitalized at [facility] now presents with fever, cough SOB and LAMAR.  CXR showing pneumonia most likely hospital acquired PNA.  Will admit for further evaluation

## 2017-06-23 NOTE — H&P ADULT - SCARS
+ multiple ulcers , round to oval shaped and different sizes, ranging from 2 inches to 4 inches , over rt. lower ext. no drainage noted. mild erythema. no sig. warmth.

## 2017-06-23 NOTE — ED ADULT NURSE NOTE - OBJECTIVE STATEMENT
pt AOX3 c/o SOB, abdominal pain that started last night, pt was recently dc for infection in her right leg. Pt was on hospice care due to diagnosis of terminal cancer. Pt has bilateral nephrectomy tubes placed on Monday June 19, 2017 and a March cathedral placed 15 days ago. March is not draining any urine, has bloody urine in bag. Dressing of nephrostomy tubes dry has not been changed since Monday. Pt also has a wound in her right leg that is wrapped by Wilian fili.

## 2017-06-23 NOTE — ED ADULT NURSE NOTE - CHPI ED SYMPTOMS NEG
no numbness/no nausea/no fever/no dizziness/no chills/no decreased eating/drinking/no tingling/no vomiting

## 2017-06-23 NOTE — ED ADULT TRIAGE NOTE - CHIEF COMPLAINT QUOTE
Pt BIBA A&Ox3 c/o difficulty breathing when she lays down. Denies CP or shortness of breath at this time.

## 2017-06-24 NOTE — PATIENT PROFILE ADULT. - TEACHING/LEARNING FACTORS IMPACT ABILITY TO LEARN
----- Message from Jewel Kwong MD sent at 3/27/2017 12:19 PM EDT -----  Tell patient she should be getting a letter from them but essentially the mammogram and ultrasound a very normal with a steady probable cyst present for the last 4 years.  They do state they would like a repeat in 6 months to confirm stability.   none

## 2017-06-24 NOTE — PROGRESS NOTE ADULT - SUBJECTIVE AND OBJECTIVE BOX
GREGORIO AGUIRREZ   ------------------------------  The patient was seen and evaluated for pneumonia.  The patient is in no acute distress.  Denied any chest pain, palpitations, shortness of breath, or abdominal pain.    Vital Signs Last 24 Hrs  T(C): 36.4, Max: 38.6 ( @ 20:00)  T(F): 97.5, Max: 101.4 ( @ 20:00)  HR: 100 (100 - 135)  BP: 120/72 (109/68 - 127/60)  BP(mean): --  RR: 20 (17 - 20)  SpO2: 96% (96% - 98%)    PHYSICAL EXAMINATION:  ----------------------------------------  General appearance: NAD, Awake, Alert  HEENT: NCAT, Conjunctiva clear, EOMI, Pupils reactive  Neck: Supple, No JVD, No tenderness  Lungs: Clear to auscultation, Breath sound equal bilaterally, No wheezes, No rales  Cardiovascular: S1S2, Regular rhythm  Abdomen: Soft, Nontender, Nondistended, No guarding/rebound, Positive bowel sounds, Nephrostomy tubes draining clear urine  Extremities: No cyanosis, No calf tenderness, Lower extremity edema, Right leg with chronic color changes and ulcerations  Neuro: Strength equal bilaterally, No tremors  Psychiatric: Appropriate mood, Normal affect    CAPILLARY BLOOD GLUCOSE  137 (2017 14:20)    LABS:  ------------------------------             7.5    8.5   )-----------( 418      ( 2017 06:04 )             24.0     06-24    141  |  100  |  4.0<L>  ----------------------------<  93  2.8<LL>   |  24.0  |  0.49<L>    Ca    7.1<L>      2017 06:04    TPro  6.4<L>  /  Alb  2.2<L>  /  TBili  0.3<L>  /  DBili  x   /  AST  60<H>  /  ALT  29  /  AlkPhos  370<H>  06-23    LIVER FUNCTIONS - ( 2017 16:37 )  Alb: 2.2 g/dL / Pro: 6.4 g/dL / ALK PHOS: 370 U/L / ALT: 29 U/L / AST: 60 U/L / GGT: x           CARDIAC MARKERS ( 2017 06:04 )  x     / <0.01 ng/mL / 18 U/L / x     / x      CARDIAC MARKERS ( 2017 16:37 )  x     / 0.02 ng/mL / 20 U/L / x     / x        PT/INR - ( 2017 06:04 )   PT: 16.6 sec;   INR: 1.50 ratio    PTT - ( 2017 06:04 )  PTT:29.4 sec    Urinalysis Basic - ( 2017 00:34 )    Color: Yellow / Appearance: Clear / S.010 / pH: x  Gluc: x / Ketone: Negative  / Bili: Negative / Urobili: Negative mg/dL   Blood: x / Protein: 15 mg/dL / Nitrite: Negative   Leuk Esterase: Trace / RBC: 6-10 /HPF / WBC 0-2   Sq Epi: x / Non Sq Epi: Occasional / Bacteria: Occasional    MEDICATIONS  (STANDING):  fluconAZOLE   Tablet 100milliGRAM(s) Oral daily  diltiazem    Tablet 60milliGRAM(s) Oral four times a day  gabapentin 300milliGRAM(s) Oral two times a day  pantoprazole    Tablet 40milliGRAM(s) Oral before breakfast  rivaroxaban 20milliGRAM(s) Oral daily  spironolactone 50milliGRAM(s) Oral daily  furosemide    Tablet 40milliGRAM(s) Oral daily  ALBUTerol/ipratropium for Nebulization 3milliLiter(s) Nebulizer every 6 hours  ALBUTerol    90 MICROgram(s) HFA Inhaler 1Puff(s) Inhalation every 4 hours  tiotropium 18 MICROgram(s) Capsule 1Capsule(s) Inhalation daily  piperacillin/tazobactam IVPB. 3.375Gram(s) IV Intermittent every 8 hours  multivitamin 1Tablet(s) Oral daily  vancomycin  IVPB 1000milliGRAM(s) IV Intermittent every 8 hours  potassium chloride    Tablet ER 20milliEquivalent(s) Oral every 2 hours    MEDICATIONS  (PRN):  LORazepam     Tablet 0.5milliGRAM(s) Oral three times a day PRN Anxiety  ondansetron Injectable 4milliGRAM(s) IV Push every 6 hours PRN Nausea and/or Vomiting  acetaminophen   Tablet 650milliGRAM(s) Oral every 6 hours PRN For Temp greater than or equal to  38 C (100.4 F)  guaiFENesin    Syrup 200milliGRAM(s) Oral every 6 hours PRN Cough      ASSESSMENT / PLAN:  -----------------------------------  Pneumonia - On pipercillin/tazobactam and vancomycin given her recent hospital admission. Oxygen saturation was 97% on ambient air. Culture results to be followed. CT of the chest is pending.    Hypokalemia - Potassium supplemented. Repeat laboratory studies ordered to monitor.    DVT - Anticoagulation with rivaroxaban.    Anemia - Most likely due to chronic disease / malignancy. Repeat laboratory studies ordered to monitor.    Cervical cancer - Nephrostomy tubes draining clear urine.

## 2017-06-25 NOTE — PROGRESS NOTE ADULT - SUBJECTIVE AND OBJECTIVE BOX
GREGORIO AGUIRREZ   ------------------------------  The patient was seen and evaluated for pneumonia.  The patient is in no acute distress.  Feels better today.    Vital Signs Last 24 Hrs  T(C): 36.2, Max: 37.7 ( @ 00:58)  T(F): 97.1, Max: 99.8 ( @ 00:58)  HR: 99 (91 - 108)  BP: 119/75 (94/69 - 123/78)  BP(mean): 7 (7 - 7)  RR: 16 (16 - 20)  SpO2: 94% (94% - 95%)    PHYSICAL EXAMINATION:  ----------------------------------------  General appearance: NAD, Awake, Alert  HEENT: NCAT, Conjunctiva clear, EOMI  Neck: Supple, No JVD, No tenderness  Lungs: Clear to auscultation, Breath sound equal bilaterally, No wheezes, No rales  Cardiovascular: S1S2, Regular rhythm  Abdomen: Soft, Nontender, Nondistended, Positive bowel sounds, Nephrostomy tubes draining clear urine  Extremities: No cyanosis, No calf tenderness, Lower extremity edema, Right leg dressings in place  Neuro: Strength equal bilaterally, No tremors  Psychiatric: Appropriate mood, Normal affect    CAPILLARY BLOOD GLUCOSE  137 (2017 14:20)    LABS:  ------------------------------             7.4    9.1   )-----------( 416      ( 2017 05:43 )             23.6     06-25    135  |  94<L>  |  6.0<L>  ----------------------------<  94  3.8   |  22.0  |  0.75    Ca    6.8<L>      2017 05:43  Mg     1.0     -    TPro  6.4<L>  /  Alb  2.2<L>  /  TBili  0.3<L>  /  DBili  x   /  AST  60<H>  /  ALT  29  /  AlkPhos  370<H>  06-23    LIVER FUNCTIONS - ( 2017 16:37 )  Alb: 2.2 g/dL / Pro: 6.4 g/dL / ALK PHOS: 370 U/L / ALT: 29 U/L / AST: 60 U/L / GGT: x           CARDIAC MARKERS ( 2017 17:10 )  x     / 0.01 ng/mL / 16 U/L / x     / x      CARDIAC MARKERS ( 2017 13:48 )  x     / <0.01 ng/mL / 18 U/L / x     / x      CARDIAC MARKERS ( 2017 06:04 )  x     / <0.01 ng/mL / 18 U/L / x     / x      CARDIAC MARKERS ( 2017 16:37 )  x     / 0.02 ng/mL / 20 U/L / x     / x        PT/INR - ( 2017 06:04 )   PT: 16.6 sec;   INR: 1.50 ratio    PTT - ( 2017 06:04 )  PTT:29.4 sec    Urinalysis Basic - ( 2017 00:34 )  Color: Yellow / Appearance: Clear / S.010 / pH: x  Gluc: x / Ketone: Negative  / Bili: Negative / Urobili: Negative mg/dL   Blood: x / Protein: 15 mg/dL / Nitrite: Negative   Leuk Esterase: Trace / RBC: 6-10 /HPF / WBC 0-2   Sq Epi: x / Non Sq Epi: Occasional / Bacteria: Occasional    MEDICATIONS  (STANDING):  diltiazem    Tablet 60milliGRAM(s) Oral four times a day  gabapentin 300milliGRAM(s) Oral two times a day  pantoprazole    Tablet 40milliGRAM(s) Oral before breakfast  rivaroxaban 20milliGRAM(s) Oral daily  spironolactone 50milliGRAM(s) Oral daily  furosemide    Tablet 40milliGRAM(s) Oral daily  ALBUTerol/ipratropium for Nebulization 3milliLiter(s) Nebulizer every 6 hours  ALBUTerol    90 MICROgram(s) HFA Inhaler 1Puff(s) Inhalation every 4 hours  tiotropium 18 MICROgram(s) Capsule 1Capsule(s) Inhalation daily  piperacillin/tazobactam IVPB. 3.375Gram(s) IV Intermittent every 8 hours  multivitamin 1Tablet(s) Oral daily  lactobacillus acidophilus 1Tablet(s) Oral two times a day with meals  magnesium oxide 400milliGRAM(s) Oral three times a day with meals  vancomycin  IVPB 1000milliGRAM(s) IV Intermittent every 12 hours    MEDICATIONS  (PRN):  LORazepam     Tablet 0.5milliGRAM(s) Oral three times a day PRN Anxiety  ondansetron Injectable 4milliGRAM(s) IV Push every 6 hours PRN Nausea and/or Vomiting  acetaminophen   Tablet 650milliGRAM(s) Oral every 6 hours PRN For Temp greater than or equal to  38 C (100.4 F)  guaiFENesin    Syrup 200milliGRAM(s) Oral every 6 hours PRN Cough      ASSESSMENT / PLAN:  -----------------------------------  Pneumonia - The patient has a history of prior hospitalization and is on pipercillin/tazobactam and vancomycin. Oxygen saturation was 98% on ambient air. Culture results pending. CT of the chest with pleural effusions, on furosemide.    Hypokalemia - Potassium supplemented with improvement. Magnesium supplemented for hypomagnesemia.    DVT - Anticoagulation with rivaroxaban.    Anemia - Most likely due to chronic disease / malignancy.    Cervical cancer - Nephrostomy tubes in place and draining clear urine.

## 2017-06-26 NOTE — PHYSICAL THERAPY INITIAL EVALUATION ADULT - PERTINENT HX OF CURRENT PROBLEM, REHAB EVAL
48 y/o female who was admitted at [facility] for about 15 days  for multiple complaints and was discharged 2 days ago and today was having dry cough more than her baseline, difficulty breathing and felt warm last night as per . Hx of right LE DVT on anticoagulant for 21+ days; admitted with pneumonia

## 2017-06-26 NOTE — PROGRESS NOTE ADULT - SUBJECTIVE AND OBJECTIVE BOX
GREGORIO BROOKSIREZ   ------------------------------  The patient was seen and evaluated for pneumonia.  The patient is in no acute distress.  Denied any chest pain, palpitations, shortness of breath, or abdominal pain.    Vital Signs Last 24 Hrs  T(C): 36.6, Max: 38 (06-25 @ 22:11)  T(F): 97.9, Max: 100.4 (06-25 @ 22:11)  HR: 100 (99 - 123)  BP: 112/78 (85/68 - 116/80)  BP(mean): --  RR: 19 (16 - 22)  SpO2: 94% (94% - 99%)    PHYSICAL EXAMINATION:  ----------------------------------------  General appearance: NAD, Awake, Alert  HEENT: NCAT, Conjunctiva clear, EOMI  Neck: Supple, No JVD, No tenderness  Lungs: Clear to auscultation, Breath sound equal bilaterally, No wheezes, No rales  Cardiovascular: S1S2, Regular rhythm  Abdomen: Soft, Nontender, Nondistended, Positive bowel sounds, Nephrostomy tubes draining clear urine  Extremities: No cyanosis, No calf tenderness, Lower extremity edema, Right leg dressings in place  Neuro: Strength equal bilaterally, No tremors  Psychiatric: Appropriate mood, Normal affect    CAPILLARY BLOOD GLUCOSE  137 (23 Jun 2017 14:20)    LABS:  ------------------------------             7.4    9.1   )-----------( 416      ( 25 Jun 2017 05:43 )             23.6     06-26    133<L>  |  95<L>  |  9.0  ----------------------------<  90  3.6   |  23.0  |  1.05    Ca    7.1<L>      26 Jun 2017 07:52  Mg     1.8     06-26    CARDIAC MARKERS ( 24 Jun 2017 17:10 )  x     / 0.01 ng/mL / 16 U/L / x     / x      CARDIAC MARKERS ( 24 Jun 2017 13:48 )  x     / <0.01 ng/mL / 18 U/L / x     / x        RECENT CULTURES:  06-24 .Blood Blood-Peripheral XXXX XXXX   No growth at 48 hours    06-24 .Urine Nephrostomy - Left XXXX XXXX   No growth    MEDICATIONS  (STANDING):  diltiazem    Tablet 60milliGRAM(s) Oral four times a day  gabapentin 300milliGRAM(s) Oral two times a day  pantoprazole    Tablet 40milliGRAM(s) Oral before breakfast  rivaroxaban 20milliGRAM(s) Oral daily  spironolactone 50milliGRAM(s) Oral daily  furosemide    Tablet 40milliGRAM(s) Oral daily  ALBUTerol/ipratropium for Nebulization 3milliLiter(s) Nebulizer every 6 hours  ALBUTerol    90 MICROgram(s) HFA Inhaler 1Puff(s) Inhalation every 4 hours  tiotropium 18 MICROgram(s) Capsule 1Capsule(s) Inhalation daily  piperacillin/tazobactam IVPB. 3.375Gram(s) IV Intermittent every 8 hours  multivitamin 1Tablet(s) Oral daily  lactobacillus acidophilus 1Tablet(s) Oral two times a day with meals  magnesium oxide 400milliGRAM(s) Oral three times a day with meals  vancomycin  IVPB 1000milliGRAM(s) IV Intermittent every 12 hours    MEDICATIONS  (PRN):  LORazepam     Tablet 0.5milliGRAM(s) Oral three times a day PRN Anxiety  ondansetron Injectable 4milliGRAM(s) IV Push every 6 hours PRN Nausea and/or Vomiting  acetaminophen   Tablet 650milliGRAM(s) Oral every 6 hours PRN For Temp greater than or equal to  38 C (100.4 F)  guaiFENesin    Syrup 200milliGRAM(s) Oral every 6 hours PRN Cough      ASSESSMENT / PLAN:  -----------------------------------  Pneumonia - On pipercillin/tazobactam and vancomycin. Oxygen saturation was 96% on ambient air. Culture without growth so far. CT of the chest with pleural effusions, on furosemide.    Hypokalemia - Potassium supplemented with improvement. Magnesium supplemented with improvement.    DVT - Anticoagulation with rivaroxaban.    Anemia - Most likely due to chronic disease / malignancy.    Cervical cancer - Nephrostomy tubes in place and draining clear urine. March catheter attached to a leg bag present on admission. Minimal output from March catheter, to be removed.    Continue on wound care for left leg.

## 2017-06-26 NOTE — PHYSICAL THERAPY INITIAL EVALUATION ADULT - CRITERIA FOR SKILLED THERAPEUTIC INTERVENTIONS
rehab potential/predicted duration of therapy intervention/risk reduction/prevention/anticipated discharge recommendation/functional limitations in following categories/impairments found/therapy frequency/anticipated equipment needs at discharge

## 2017-06-27 NOTE — DISCHARGE NOTE ADULT - MEDICATION SUMMARY - MEDICATIONS TO TAKE
I will START or STAY ON the medications listed below when I get home from the hospital:    dilTIAZem 60 mg oral tablet  -- 1 tab(s) by mouth 4 times a day  -- Indication: For HTN    Xarelto 15 mg oral tablet  -- 1 tab(s) by mouth 2 times a day  -- Indication: For DVT    fluconazole 100 mg oral tablet  -- 1 tab(s) by mouth once a day  -- Indication: For Rash    Augmentin 875 mg-125 mg oral tablet  -- 875 milligram(s) by mouth every 12 hours  -- Finish all this medication unless otherwise directed by prescriber.  Take with food or milk.    -- Indication: For PnA    omeprazole 20 mg oral delayed release capsule  -- 1 cap(s) by mouth once a day  -- Indication: For Gastritis

## 2017-06-27 NOTE — DISCHARGE NOTE ADULT - MEDICATION SUMMARY - MEDICATIONS TO STOP TAKING
I will STOP taking the medications listed below when I get home from the hospital:    spironolactone 50 mg oral tablet  -- 1 tab(s) by mouth 2 times a day    LORazepam 0.5 mg oral tablet  -- 1 tab(s) by mouth 3 times a day, As Needed

## 2017-06-27 NOTE — DISCHARGE NOTE ADULT - HOSPITAL COURSE
47F with a recent admission to [facility] requiring placement of nephrostomy tubes presented with dyspnea and cough. On presentation, WBC(9.8), H/H(8.2/25.4), INR(2.54). Chest Xray noted a left lower lobe infiltrate. Intravenous antibiotics were initiated for health care associated pneumonia. CT of the abdomen noted a moderate left pleural effusion, right lower lobe nodules, severe fatty infiltration of enlarged liver. Lower extremity Doppler noted a right lower extremity DVT. The patient had improvement in her symptoms. Echocardiogram noted ejection fraction of 60 to 65%. CT of the chest noted a small to moderate-sized left pleural effusion with compressive atelectasis at the left lung base, bilateral lung nodules. Oxygen saturation on ambient air at rest was noted to be 97%. Urine and blood cultures were without growth. The patient was noted to have a March catheter on admission which was without significant output in the setting of bilateral nephrostomy tubs and the March catheter was removed. Repeat laboratory results noted a stable anemia. The patient remained stable. She was discharged home with instructions to follow up with her primary care physician and oncologist for further management. She was also to follow the discharged instructions from [facility] as well. 47F with a recent admission to [facility] requiring placement of nephrostomy tubes presented with dyspnea and cough. On presentation, WBC(9.8), H/H(8.2/25.4), INR(2.54). Chest Xray noted a left lower lobe infiltrate. Intravenous antibiotics were initiated for health care associated pneumonia. CT of the abdomen noted a moderate left pleural effusion, right lower lobe nodules, severe fatty infiltration of enlarged liver. Lower extremity Doppler noted a right lower extremity DVT. The patient had improvement in her symptoms. Echocardiogram noted ejection fraction of 60 to 65%. CT of the chest noted a small to moderate-sized left pleural effusion with compressive atelectasis at the left lung base, bilateral lung nodules. Oxygen saturation on ambient air at rest was noted to be 97%. Urine and blood cultures were without growth. The patient was noted to have a March catheter on admission which was without significant output in the setting of bilateral nephrostomy tubs and the March catheter was removed. Repeat laboratory results noted a stable anemia. The patient remained stable. She was discharged home with instructions to follow up with her primary care physician and oncologist for further management. She was also to follow the discharged instructions from [facility] as well.    35 minutes total time

## 2017-06-27 NOTE — PROVIDER CONTACT NOTE (CRITICAL VALUE NOTIFICATION) - ACTION/TREATMENT ORDERED:
Md made awre
Raheem held for 2200 on 6/26/2017.
REVIEWING PATIENTS CHART AND ORDERING MAG SUPPLEMENT. HOLD VANCO

## 2017-06-27 NOTE — DISCHARGE NOTE ADULT - PATIENT PORTAL LINK FT
“You can access the [facility] Patient Portal, offered by [facility], by registering with the following website: [email or web address]”

## 2017-06-27 NOTE — PROGRESS NOTE ADULT - SUBJECTIVE AND OBJECTIVE BOX
GREGORIO BAZZI   ------------------------------  The patient was seen and evaluated for pneumonia.  The patient is in no acute distress.  Offers no complaints.  Family at bedside assisting with translation.    Vital Signs Last 24 Hrs  T(C): 36.7 (27 Jun 2017 07:46), Max: 36.8 (27 Jun 2017 04:51)  T(F): 98 (27 Jun 2017 07:46), Max: 98.3 (27 Jun 2017 04:51)  HR: 96 (27 Jun 2017 07:46) (96 - 128)  BP: 112/76 (27 Jun 2017 07:46) (93/70 - 128/89)  BP(mean): --  RR: 18 (27 Jun 2017 07:46) (18 - 20)  SpO2: 97% (27 Jun 2017 04:51) (96% - 98%)    PHYSICAL EXAMINATION:  ----------------------------------------  General appearance: NAD, Awake, Alert  HEENT: NCAT, Conjunctiva clear, EOMI  Neck: Supple, No JVD, No tenderness  Lungs: Clear to auscultation, Breath sound equal bilaterally, No wheezes, No rales  Cardiovascular: S1S2, Regular rhythm  Abdomen: Soft, Nontender, Nondistended, Positive bowel sounds, Nephrostomy tubes draining clear urine  Extremities: No cyanosis, No calf tenderness, Lower extremity edema, Right leg dressings in place  Neuro: Strength equal bilaterally, No tremors  Psychiatric: Appropriate mood, Normal affect    CAPILLARY BLOOD GLUCOSE  137 (23 Jun 2017 14:20)    LABS:  ------------------------------             8.0    8.6   )-----------( 432      ( 27 Jun 2017 06:35 )             25.4     06-26    133<L>  |  95<L>  |  9.0  ----------------------------<  90  3.6   |  23.0  |  1.05    Ca    7.1<L>      26 Jun 2017 07:52  Mg     1.8     06-26    MEDICATIONS  (STANDING):  diltiazem    Tablet 60 milliGRAM(s) Oral four times a day  gabapentin 300 milliGRAM(s) Oral two times a day  pantoprazole    Tablet 40 milliGRAM(s) Oral before breakfast  rivaroxaban 20 milliGRAM(s) Oral daily  spironolactone 50 milliGRAM(s) Oral daily  furosemide    Tablet 40 milliGRAM(s) Oral daily  tiotropium 18 MICROgram(s) Capsule 1 Capsule(s) Inhalation daily  piperacillin/tazobactam IVPB. 3.375 Gram(s) IV Intermittent every 8 hours  multivitamin 1 Tablet(s) Oral daily  lactobacillus acidophilus 1 Tablet(s) Oral two times a day with meals  magnesium oxide 400 milliGRAM(s) Oral three times a day with meals  vancomycin  IVPB 1000 milliGRAM(s) IV Intermittent every 12 hours    MEDICATIONS  (PRN):  LORazepam     Tablet 0.5 milliGRAM(s) Oral three times a day PRN Anxiety  ondansetron Injectable 4 milliGRAM(s) IV Push every 6 hours PRN Nausea and/or Vomiting  acetaminophen   Tablet 650 milliGRAM(s) Oral every 6 hours PRN For Temp greater than or equal to  38 C (100.4 F)  guaiFENesin    Syrup 200 milliGRAM(s) Oral every 6 hours PRN Cough  ALBUTerol/ipratropium for Nebulization 3 milliLiter(s) Nebulizer every 6 hours PRN Bronchospasm      ASSESSMENT / PLAN:  -----------------------------------  Pneumonia - Afebrile on pipercillin/tazobactam and vancomycin, cultures were without growth. Oxygen saturation was 96% on ambient air.    Hypokalemia - Potassium supplemented with improvement. Magnesium supplemented with improvement.    DVT - Anticoagulation with rivaroxaban.    Anemia - Most likely due to chronic disease / malignancy.    Cervical cancer - Nephrostomy tubes in place and draining clear urine.     Continue on wound care for left leg.    [facility] contacted and home medications confirmed. Also confirmed that the patient had most recently had prescriptions from Dr. Portillo and Skye. To follow up with them for further management. Also to follow up with Dr. Maria A Arana as well.    35 minutes total time

## 2017-06-27 NOTE — DISCHARGE NOTE ADULT - PLAN OF CARE
. Complete the course of antibiotics. Follow up with your primary care physician for further management. Follow up with Dr. Moustapha Wang for further management. Follow up with Urology in regards to the nephrostomy tubes. Continue on Xarelto. Follow up with your primary care physician for further management.

## 2017-06-27 NOTE — DISCHARGE NOTE ADULT - CARE PLAN
Principal Discharge DX:	Pneumonia  Goal:	.  Instructions for follow-up, activity and diet:	Complete the course of antibiotics. Follow up with your primary care physician for further management.  Secondary Diagnosis:	Cervical cancer  Instructions for follow-up, activity and diet:	Follow up with Dr. Moustapha Wang for further management. Follow up with Urology in regards to the nephrostomy tubes.  Secondary Diagnosis:	DVT (deep venous thrombosis)  Instructions for follow-up, activity and diet:	Continue on Xarelto. Follow up with your primary care physician for further management.

## 2017-06-27 NOTE — DISCHARGE NOTE ADULT - CARE PROVIDER_API CALL
Maria A Arana), Medical Oncology  [address]  [place] [address]  Phone: [phone]  Fax: [phone]    Moustapha Wang), Gynecologic Oncology; Obstetrics and Gynecology  [facility] GYNOCOLOGIC ONCOLOGY [address] [place] [address]  Phone: [phone]  Fax: [phone]

## 2017-07-29 NOTE — ED ADULT NURSE REASSESSMENT NOTE - NS ED NURSE REASSESS COMMENT FT1
Rocuronium 90mg and Etomidate 20mg. Third PIV placed. Pt remains obtunded and code team remains at bedside. Levophed gtt initiated. BP 60/30. Rocuronium 90mg and Etomidate 20mg ready for intubation.  Third PIV placed. Pt remains obtunded and code team remains at bedside. Levophed gtt initiated. BP 60/30.

## 2017-07-29 NOTE — ED ADULT TRIAGE NOTE - CHIEF COMPLAINT QUOTE
BIBA, patient is awake and oriented times 3, complains of SOB, hx patient is on hospice for cervical CA

## 2017-07-29 NOTE — CONSULT NOTE ADULT - PROBLEM SELECTOR RECOMMENDATION 4
Trend BMP for renal function and electrolytes  Magnesium, Potassium, Glucose replaced  Continue to replace electrolytes as needed  Monitor I&Os  Renally dose meds  Avoid nephrotoxic agents as possible

## 2017-07-29 NOTE — ED PROVIDER NOTE - OBJECTIVE STATEMENT
47F wit h/o cervical CA on hospice ( full CODE), DVT ? on coumadin, presenting in respiratory distress, hypotensive.  EMS reports hypoxia on RA upon arrival and tachypnea.  Pt recently admitted for pneumonia.  Pt and  unable to provide history.  UPon arrival I discussed code status with , at bedside and explained CPR/ intubation/ sedation and nonpalpable BP.  says he wants patient to remain full code. He wants CPR and intubation.

## 2017-07-29 NOTE — CONSULT NOTE ADULT - PROBLEM SELECTOR RECOMMENDATION 9
Patient currently on full ventilatory support.   Will titrate vent settings to maintain SaO2 >90%, or pH >7.25.   Trend serial ABGs  Consider low tidal volume ventilation strategy w/ goal Tv 4-6 cc/kg of ideal body weight and plateau pressure goal <30.   VAP prophylaxis with Peridex oral care.   Aggressive chest PT and suctioning.   Daily spontaneous breathing trial if clinical condition warrants, discuss with respiratory therapy.  Aspiration precautions, HOB > 30 deg

## 2017-07-29 NOTE — ED ADULT NURSE REASSESSMENT NOTE - NS ED NURSE REASSESS COMMENT FT1
code team at bedside, @ 1917 20 etomidate and 90 rocc given my MD calixto, intubated by RT thais with size 8 ET tube, bilateral breath sound cta, + co2 color change, pending chest xray, code team at bedside, @ 1917 20 etomidate and 90 rocc given my MD calixto, intubated by RT thais with size 8 ET tube, 22 @ lip, bilateral breath sound cta, + co2 color change, pending chest xray,

## 2017-07-29 NOTE — ED PROVIDER NOTE - PROGRESS NOTE DETAILS
Upon arrival patient saturating 97% on NRB: undetectable BP and tachycardic. Multiple IVs placed for access and patient bolused with IVF. levophed started peripherally.  Placed on BIPAP.  Intubation held until BP improved with levophed. Once BP improved, patient was intubated using etomidate/ rocuronium.  Pt sedated with propofol and ICU called for admission.. ICU pA. Now at bedside.

## 2017-07-29 NOTE — CONSULT NOTE ADULT - SUBJECTIVE AND OBJECTIVE BOX
Patient is a 47y old  Female who presents with a chief complaint of     BRIEF HOSPITAL COURSE: ***    Events last 24 hours: ***    PAST MEDICAL & SURGICAL HISTORY:  DVT (deep venous thrombosis): LLE  Intestinal obstruction  Cervical cancer  H/O intestinal obstruction  S/P cholecystectomy  S/P appendectomy      Review of Systems:  CONSTITUTIONAL: No fever, chills, or fatigue  EYES: No eye pain, visual disturbances, or discharge  ENMT:  No difficulty hearing, tinnitus, vertigo; No sinus or throat pain  NECK: No pain or stiffness  RESPIRATORY: No cough, wheezing, chills or hemoptysis; No shortness of breath  CARDIOVASCULAR: No chest pain, palpitations, dizziness, or leg swelling  GASTROINTESTINAL: No abdominal or epigastric pain. No nausea, vomiting, or hematemesis; No diarrhea or constipation. No melena or hematochezia.  GENITOURINARY: No dysuria, frequency, hematuria, or incontinence  NEUROLOGICAL: No headaches, memory loss, loss of strength, numbness, or tremors  SKIN: No itching, burning, rashes, or lesions   MUSCULOSKELETAL: No joint pain or swelling; No muscle, back, or extremity pain  PSYCHIATRIC: No depression, anxiety, mood swings, or difficulty sleeping      Medications:  vancomycin  IVPB 1000 milliGRAM(s) IV Intermittent once  piperacillin/tazobactam IVPB. 3.375 Gram(s) IV Intermittent every 8 hours    norepinephrine Infusion 0.05 MICROgram(s)/kG/Min IV Continuous <Continuous>      propofol Infusion 5 MICROgram(s)/kG/Min IV Continuous <Continuous>  acetaminophen  Suppository 975 milliGRAM(s) Rectal once  acetaminophen    Suspension 650 milliGRAM(s) Oral every 6 hours PRN      heparin  Injectable 5000 Unit(s) SubCutaneous every 8 hours    pantoprazole  Injectable 40 milliGRAM(s) IV Push daily      vasopressin Infusion 0.04 Unit(s)/Min IV Continuous <Continuous>  methylPREDNISolone sodium succinate Injectable 100 milliGRAM(s) IV Push every 8 hours    sodium chloride 0.9% Bolus 2000 milliLiter(s) IV Bolus once  sodium bicarbonate  Infusion 0.124 mEq/kG/Hr IV Continuous <Continuous>  sodium bicarbonate  Injectable 150 milliEquivalent(s) IV Push once  magnesium sulfate  IVPB 2 Gram(s) IV Intermittent once  potassium chloride  10 mEq/100 mL IVPB 10 milliEquivalent(s) IV Intermittent once  calcium gluconate IVPB 2 Gram(s) IV Intermittent once      chlorhexidine 0.12% Liquid 15 milliLiter(s) Swish and Spit two times a day        Mode: AC/ CMV (Assist Control/ Continuous Mandatory Ventilation)  RR (machine): 30  TV (machine): 400  FiO2: 40  PEEP: 5  MAP: 11  PIP: 26      ICU Vital Signs Last 24 Hrs  T(C): 39.7 (2017 19:35), Max: 39.7 (2017 19:35)  T(F): 103.5 (2017 19:35), Max: 103.5 (2017 19:35)  HR: 115 (2017 23:34) (115 - 170)  BP: 104/58 (2017 20:41) (64/32 - 142/63)  BP(mean): --  ABP: --  ABP(mean): --  RR: 20 (2017 20:41) (20 - 40)  SpO2: 97% (2017 23:34) (94% - 100%)      ABG - ( 2017 23:21 )  pH: 6.97  /  pCO2: 35    /  pO2: 96    / HCO3: 8     / Base Excess: -22.2 /  SaO2: 92                  I&O's Detail    2017 07:01  -  2017 23:41  --------------------------------------------------------  IN:    norepinephrine Infusion: 30 mL    Sodium Chloride 0.9% IV Bolus: 2000 mL    Solution: 100 mL  Total IN: 2130 mL    OUT:    Nephrostomy Tube: 50 mL  Total OUT: 50 mL    Total NET: 2080 mL            LABS:                        8.8    6.3   )-----------( x        ( 2017 23:00 )             27.6     07-29    135  |  100  |  9.0  ----------------------------<  61<L>  3.3<L>   |  9.0<LL>  |  1.54<H>    Ca    7.0<L>      2017 23:01  Phos  4.5     07-29  Mg     1.4         TPro  5.7<L>  /  Alb  2.1<L>  /  TBili  2.1<H>  /  DBili  x   /  AST  189<H>  /  ALT  62<H>  /  AlkPhos  276<H>            CAPILLARY BLOOD GLUCOSE  85 (2017 18:20)        PT/INR - ( 2017 23:00 )   PT: 24.6 sec;   INR: 2.20 ratio         PTT - ( 2017 23:00 )  PTT:42.5 sec  Urinalysis Basic - ( 2017 22:59 )    Color: Yellow / Appearance: x / S.010 / pH: x  Gluc: x / Ketone: Negative  / Bili: Negative / Urobili: 1 mg/dL   Blood: x / Protein: 500 mg/dL / Nitrite: Positive   Leuk Esterase: Moderate / RBC: >50 /HPF / WBC 6-10   Sq Epi: x / Non Sq Epi: x / Bacteria: Few      CULTURES:      Physical Examination:    General: No acute distress.      HEENT: Pupils equal, reactive to light.  Symmetric.    PULM: Clear to auscultation bilaterally, no significant sputum production    CVS: Regular rate and rhythm, no murmurs, rubs, or gallops    ABD: Soft, nondistended, nontender, normoactive bowel sounds, no masses    EXT: No edema, nontender    SKIN: Warm and well perfused, no rashes noted.    NEURO: Alert, oriented, interactive, nonfocal    RADIOLOGY: ***    CRITICAL CARE TIME SPENT: I have spent *** minutes of critical care time evaluating and treating this patient, including reviewing charts, labs, imagining studies, and collaborating with interdisciplinary team. Patient is a 47y old  Female who presents with a chief complaint of     BRIEF HOSPITAL COURSE: 48 yo female, PMHx metastatic cervical CA, DVT on Xarelto, presented to ED BIBA from home hospice after pt's  called hospice nurse for evaluation of fevers, vomiting, and decreased output from L nephrostomy tube x 2 days. Hospice RN recommended to have patient transported to [facility] to have L nephrostomy tube changed. EMS brought pt to [facility], upon arrival patient was reported to be obtunded, hypotensive, and febrile 103.5'F MS. Pt was noted to have fixed dilated pupils and poor neurologic exam. Pt was then intubated for hypoxemic respiratory failure and started on vasoactive agents for presumed septic shock (lactate 12.8). Treated with IV fluids and Zosyn in ED. CT head negative.    Patient has known metastatic cerivcal CA for which she has been on Hospice since 10/2016, when she was told she had 2 months to live. Had received radiation and chemotherapy in the past, last dose of chemotherapy in 10/2016. Bilateral nephrostomy tubes were placed at [facility] in 2017 while admitted x 2 weeks for unknown reason. Pt was admitted at [facility] in 2016 2 days following discharge from [facility] for pneumonia.    Events last 24 hours: ICU consulted called for respiratory failure and septic shock. Pt with poor neurologic exam, however had just been given paralytic during intubation. Hypotensive on Levophed. Intubated on full ventilatory support, ABG revealed a profound metabolic acidosis. Pending urinalysis, however urine in nephrostomy bags appeared cloudy. No CMP obtained in ED.    PAST MEDICAL & SURGICAL HISTORY:  DVT (deep venous thrombosis): LLE  Intestinal obstruction  Cervical cancer  H/O intestinal obstruction  S/P cholecystectomy  S/P appendectomy      Review of Systems: unable to obtain given       Medications:  vancomycin  IVPB 1000 milliGRAM(s) IV Intermittent once  piperacillin/tazobactam IVPB. 3.375 Gram(s) IV Intermittent every 8 hours  norepinephrine Infusion 0.05 MICROgram(s)/kG/Min IV Continuous <Continuous>  acetaminophen  Suppository 975 milliGRAM(s) Rectal once  acetaminophen    Suspension 650 milliGRAM(s) Oral every 6 hours PRN  heparin  Injectable 5000 Unit(s) SubCutaneous every 8 hours  pantoprazole  Injectable 40 milliGRAM(s) IV Push daily  vasopressin Infusion 0.04 Unit(s)/Min IV Continuous <Continuous>  methylPREDNISolone sodium succinate Injectable 100 milliGRAM(s) IV Push every 8 hours  sodium chloride 0.9% Bolus 2000 milliLiter(s) IV Bolus once  sodium bicarbonate  Infusion 0.124 mEq/kG/Hr IV Continuous <Continuous>  sodium bicarbonate  Injectable 150 milliEquivalent(s) IV Push once  magnesium sulfate  IVPB 2 Gram(s) IV Intermittent once  potassium chloride  10 mEq/100 mL IVPB 10 milliEquivalent(s) IV Intermittent once  calcium gluconate IVPB 2 Gram(s) IV Intermittent once  chlorhexidine 0.12% Liquid 15 milliLiter(s) Swish and Spit two times a day      Mode: AC/ CMV (Assist Control/ Continuous Mandatory Ventilation)  RR (machine): 30  TV (machine): 400  FiO2: 40  PEEP: 5  MAP: 11  PIP: 26      ICU Vital Signs Last 24 Hrs  T(C): 39.7 (2017 19:35), Max: 39.7 (2017 19:35)  T(F): 103.5 (2017 19:35), Max: 103.5 (2017 19:35)  HR: 115 (2017 23:34) (115 - 170)  BP: 104/58 (2017 20:41) (64/32 - 142/63)  BP(mean): --  ABP: --  ABP(mean): --  RR: 20 (2017 20:41) (20 - 40)  SpO2: 97% (2017 23:34) (94% - 100%)      ABG - ( 2017 23:21 )  pH: 6.97  /  pCO2: 35    /  pO2: 96    / HCO3: 8     / Base Excess: -22.2 /  SaO2: 92                  I&O's Detail    2017 07:01  -  2017 23:41  --------------------------------------------------------  IN:    norepinephrine Infusion: 30 mL    Sodium Chloride 0.9% IV Bolus: 2000 mL    Solution: 100 mL  Total IN: 2130 mL    OUT:    Nephrostomy Tube: 50 mL  Total OUT: 50 mL    Total NET: 2080 mL            LABS:                        8.8    6.3   )-----------( x        ( 2017 23:00 )             27.6         135  |  100  |  9.0  ----------------------------<  61<L>  3.3<L>   |  9.0<LL>  |  1.54<H>    Ca    7.0<L>      2017 23:01  Phos  4.5       Mg     1.4         TPro  5.7<L>  /  Alb  2.1<L>  /  TBili  2.1<H>  /  DBili  x   /  AST  189<H>  /  ALT  62<H>  /  AlkPhos  276<H>            CAPILLARY BLOOD GLUCOSE  85 (2017 18:20)        PT/INR - ( 2017 23:00 )   PT: 24.6 sec;   INR: 2.20 ratio         PTT - ( 2017 23:00 )  PTT:42.5 sec  Urinalysis Basic - ( 2017 22:59 )    Color: Yellow / Appearance: x / S.010 / pH: x  Gluc: x / Ketone: Negative  / Bili: Negative / Urobili: 1 mg/dL   Blood: x / Protein: 500 mg/dL / Nitrite: Positive   Leuk Esterase: Moderate / RBC: >50 /HPF / WBC 6-10   Sq Epi: x / Non Sq Epi: x / Bacteria: Few      CULTURES:      Physical Examination:    General: Female lying in bed in no acute distress.      HEENT: Pupils dilated, 9mm bilaterally, non-reactive to light. Symmetric.    PULM: Intubated. Course breath sounds bilaterally, no significant sputum production    CVS: L chest wall port palpable. Tachycardic and regular rhythm, no murmurs, rubs, or gallops    ABD: Soft, nondistended, nontender, normoactive bowel sounds, no masses    EXT: No edema, nontender. Chronic healing RLE wound, no increased warmth or erythema to site, no pus or drainage from wound.    SKIN: Warm and well perfused, no rashes noted.    NEURO: Intubated, RASS -4, fixed dilated pupils as above, no withdrawal to noxious stimuli.     RADIOLOGY:   < from: CT Abdomen and Pelvis No Cont (17 @ 21:09) >    ******PRELIMINARY REPORT******    ******PRELIMINARY REPORT******           EXAM:  CT ABDOMEN AND PELVIS                          PROCEDURE DATE:  2017        ******PRELIMINARY REPORT******    ******PRELIMINARY REPORT******          INTERPRETATION:  [facility] RADIOLOGIST PRELIMINARY REPORT    EXAM:    CT Abdomen and Pelvis Without Intravenous Contrast    CLINICAL HISTORY:    47 years old, female; Signs and symptoms; Mass, lump, or swelling    TECHNIQUE:    Axial computed tomography images of the abdomen and pelvis without   intravenous contrast.    Coronal and sagittal reformatted images were created and reviewed.    COMPARISON:    [facility] CT ABDOMEN AND PELVIS 2017 6:53:17 PM    FINDINGS:    There is a moderate left pleural effusion with associated compressive   atelectasis, similar to previous examination. Interval development of   small   right pleural effusion versus subpleural consolidation. There is a   nonspecific   subpleural area of nodularity which may represent rounded area of   consolidation   versus soft tissue nodule measuring up to 12 x 6 mm. This is similar to   previous examination.      Liver is diffusely decreased in attenuation suggestive of fatty   infiltration.    No focal hepatic abnormalities. The spleen, pancreas and adrenals are   grossly   normal. Gallbladder is not identified and may be contracted or surgically   absent.      Kidneys are grossly normal in size, contour and axis. There are   bilateral   percutaneous nephrostomy tubes. Right pigtail isdeployed within the   right   intrarenal collecting system and left pigtail deployed in the left renal   pelvis. No mary hydronephrosis.      Nasogastric tube is present in satisfactory position. Small and large   bowel   loops are grossly normal. No evidence of enteric obstruction.   Subjectively,   there is mild colonic wall thickening in the rectosigmoid colon with   slight   inflammation of the perirectal fat. Correlation with symptoms of   proctitis   recommended.      Pelvic organs are grossly normal. Trace amount of free fluid in the   pelvis   may be physiologic. Visualized osseous structures are grossly normal for   age.    IMPRESSION:       Fatty infiltration of the liver. Mild concentric colonic wall   thickening and   zach-rectalinflammation adjacent to the rectosigmoid colon. Correlation   with   symptoms of proctitis recommended. No evidence of enteric obstruction.      No other acute intra-abdominal or pelvic process.  Additional   nonemergent   findings as described above.      ******PRELIMINARY REPORT******    ******PRELIMINARY REPORT******              SERAFIN VASQUEZ M.D.;[facility] RADIOLOGIST    < end of copied text >      CRITICAL CARE TIME SPENT: I have spent 65 minutes of critical care time evaluating and treating this patient, including reviewing charts, labs, imagining studies, and collaborating with interdisciplinary team. Several extensive conversations had with family at bedside via  service regarding patient's overall poor prognosis and goals of care. Patient's  has elected to make patient DNR at this time, and discussed options for withdrawal of care, however would like to proceed with all other aggressive treatment measures and see if patient improves.

## 2017-07-29 NOTE — ED ADULT NURSE NOTE - OBJECTIVE STATEMENT
pt arrived from home as per aide pt is on hospice and was having difficulty breathing pt nonverbal   history of cervical ca

## 2017-07-29 NOTE — ED PROVIDER NOTE - CRITICAL CARE PROVIDED
conducted a detailed discussion of DNR status/direct patient care (not related to procedure)/consultation with other physicians/documentation/interpretation of diagnostic studies

## 2017-07-29 NOTE — ED PROVIDER NOTE - MEDICAL DECISION MAKING DETAILS
47yoM with metastatic cervical CA on hospice,  full code, presenting with respiratory failure likely 2/2 septic shock , possibly pneumonia, encephalopathic.  needs resuscitation for hypotension, intubation, antibiotics, ICU consult.

## 2017-07-29 NOTE — PROCEDURE NOTE - NSPROCDETAILS_GEN_ALL_CORE
sterile dressing applied/lumen(s) aspirated and flushed/sterile technique, catheter placed/ultrasound guidance/guidewire recovered
sutured in place/all materials/supplies accounted for at end of procedure/ultrasound guidance/positive blood return obtained via catheter/Seldinger technique/connected to a pressurized flush line/location identified, draped/prepped, sterile technique used, needle inserted/introduced

## 2017-07-29 NOTE — CONSULT NOTE ADULT - PROBLEM SELECTOR RECOMMENDATION 2
Continue empiric broad spectrum antibiotics  Continue to trend lactate, will send procalcitonin  Pt received IVF at 30 cc/kg in ED, will continue with aggressive IV fluid hydration  Antipyretics PRN  Stress dose steroids  Will initiate Vitamin C therapy  Continue vasoactive agents to maintain MAP > 65  Close monitoring of hemodynamics

## 2017-07-29 NOTE — CONSULT NOTE ADULT - PROBLEM SELECTOR RECOMMENDATION 3
Likely secondary to profound septic shock  Overall poor prognosis, concerning for probability of neurologic recovery  Palliative care consult

## 2017-07-30 NOTE — H&P ADULT - HISTORY OF PRESENT ILLNESS
46 yo female, PMHx metastatic cervical CA, DVT on Xarelto, presented to ED BIBA from home hospice after pt's  called hospice nurse for evaluation of fevers, vomiting, and decreased output from L nephrostomy tube x 2 days.      Please see Critical Care PA/Attending consult note for full HPI.

## 2017-07-30 NOTE — H&P ADULT - ASSESSMENT
48 yo female, PMHx metastatic cervical CA, DVT on Xarelto with acute hypoxic respiratory failure, septic shock.    · Assessment    Problem/Recommendation - 1:  Problem: Acute respiratory failure with hypoxia. Recommendation: Patient currently on full ventilatory support.   Will titrate vent settings to maintain SaO2 >90%, or pH >7.25.   Trend serial ABGs  Consider low tidal volume ventilation strategy w/ goal Tv 4-6 cc/kg of ideal body weight and plateau pressure goal <30.   VAP prophylaxis with Peridex oral care.   Aggressive chest PT and suctioning.   Daily spontaneous breathing trial if clinical condition warrants, discuss with respiratory therapy.  Aspiration precautions, HOB > 30 deg.    Problem/Recommendation - 2:  ·  Problem: Septic shock.  Recommendation: Continue empiric broad spectrum antibiotics  Continue to trend lactate, will send procalcitonin  Pt received IVF at 30 cc/kg in ED, will continue with aggressive IV fluid hydration  Antipyretics PRN  Stress dose steroids  Will initiate Vitamin C therapy  Continue vasoactive agents to maintain MAP > 65  Close monitoring of hemodynamics.     Problem/Recommendation - 3:  ·  Problem: Altered mental status.  Recommendation: Likely secondary to profound septic shock  Overall poor prognosis, concerning for probability of neurologic recovery  Palliative care consult.     Problem/Recommendation - 4:  ·  Problem: KIRSTIN (acute kidney injury).  Recommendation: Trend BMP for renal function and electrolytes  Magnesium, Potassium, Glucose replaced  Continue to replace electrolytes as needed  Monitor I&Os  Renally dose meds  Avoid nephrotoxic agents as possible.     Problem/Recommendation - 5:  ·  Problem: DVT (deep venous thrombosis).  Recommendation: Heparin SC for DVT prophylaxis in the setting of KIRSTIN  SCDs.

## 2017-07-30 NOTE — DISCHARGE NOTE FOR THE EXPIRED PATIENT - HOSPITAL COURSE
46 yo female, PMHx metastatic cervical CA, DVT on Xarelto, presented to ED BIBA from home hospice after pt's  called hospice nurse for evaluation of fevers, vomiting, and decreased output from L nephrostomy tube x 2 days. Hospice RN recommended to have patient transported to [facility] to have L nephrostomy tube changed. EMS brought pt to [facility], upon arrival patient was reported to be obtunded, hypotensive, and febrile 103.5'F UT. Pt was noted to have fixed dilated pupils and poor neurologic exam. Pt was then intubated for hypoxemic respiratory failure and started on vasoactive agents for presumed septic shock (lactate 12.8). Treated with IV fluids and Zosyn in ED. CT head negative.    Patient has known metastatic cerivcal CA for which she has been on Hospice since 10/2016, when she was told she had 2 months to live. Had received radiation and chemotherapy in the past, last dose of chemotherapy in 10/2016. Bilateral nephrostomy tubes were placed at [facility] in 06/2017 while admitted x 2 weeks for unknown reason. Pt was admitted at [facility] in 06/2016 2 days following discharge from [facility] for pneumonia.    Events last 24 hours: ICU consulted called for respiratory failure and septic shock. Pt with poor neurologic exam, however had just been given paralytic during intubation. Hypotensive on Levophed. Intubated on full ventilatory support, ABG revealed a profound metabolic acidosis. Pending urinalysis, however urine in nephrostomy bags appeared cloudy. No CMP obtained in ED.    Patient self extubated this morning, decision made with family/ to not re-intubate and to begin comfort care measures. Patient pronounced dead @ 11:05am on 7/30/17.

## 2017-08-01 LAB
-  AMIKACIN: SIGNIFICANT CHANGE UP
-  AMPICILLIN/SULBACTAM: SIGNIFICANT CHANGE UP
-  AMPICILLIN: SIGNIFICANT CHANGE UP
-  AZTREONAM: SIGNIFICANT CHANGE UP
-  CEFAZOLIN: SIGNIFICANT CHANGE UP
-  CEFEPIME: SIGNIFICANT CHANGE UP
-  CEFOTAXIME: SIGNIFICANT CHANGE UP
-  CEFOXITIN: SIGNIFICANT CHANGE UP
-  CEFTAZIDIME: SIGNIFICANT CHANGE UP
-  CEFTRIAXONE: SIGNIFICANT CHANGE UP
-  CEFUROXIME: SIGNIFICANT CHANGE UP
-  CIPROFLOXACIN: SIGNIFICANT CHANGE UP
-  ERTAPENEM: SIGNIFICANT CHANGE UP
-  GENTAMICIN: SIGNIFICANT CHANGE UP
-  IMIPENEM: SIGNIFICANT CHANGE UP
-  LEVOFLOXACIN: SIGNIFICANT CHANGE UP
-  MEROPENEM: SIGNIFICANT CHANGE UP
-  NITROFURANTOIN: SIGNIFICANT CHANGE UP
-  PIPERACILLIN/TAZOBACTAM: SIGNIFICANT CHANGE UP
-  TIGECYCLINE: SIGNIFICANT CHANGE UP
-  TOBRAMYCIN: SIGNIFICANT CHANGE UP
-  TRIMETHOPRIM/SULFAMETHOXAZOLE: SIGNIFICANT CHANGE UP
CULTURE RESULTS: SIGNIFICANT CHANGE UP
METHOD TYPE: SIGNIFICANT CHANGE UP
ORGANISM # SPEC MICROSCOPIC CNT: SIGNIFICANT CHANGE UP
SPECIMEN SOURCE: SIGNIFICANT CHANGE UP

## 2018-07-16 PROBLEM — I82.409 ACUTE EMBOLISM AND THROMBOSIS OF UNSPECIFIED DEEP VEINS OF UNSPECIFIED LOWER EXTREMITY: Chronic | Status: ACTIVE | Noted: 2017-01-01

## 2018-08-03 NOTE — ED PROVIDER NOTE - RESPIRATORY NEGATIVE STATEMENT, MLM
Demetrio Dick  Identified pt with two pt identifiers(name and ). Chief Complaint   Patient presents with    Ankle swelling     Left ankle is more swollen than the right ankle. Skin on ankles is dark.  Foot Swelling     Top of feet and unable to wear her shoes. Toes are dark in color.  Hand Swelling     Finger are darker. Some darkness on her arms and wrist area. 1. Have you been to the ER, urgent care clinic since your last visit? Hospitalized since your last visit? No    2. Have you seen or consulted any other health care providers outside of the [address] [place] [address] since your last visit? Include any pap smears or colon screening. No    Today's provider has been notified of reason for visit, vitals and flowsheets obtained on patients. Reviewed record In preparation for visit, huddled with provider and have obtained necessary documentation      There are no preventive care reminders to display for this patient.     Wt Readings from Last 3 Encounters:   18 290 lb (131.5 kg)   18 290 lb (131.5 kg)   18 290 lb (131.5 kg)     Temp Readings from Last 3 Encounters:   18 97.9 °F (36.6 °C) (Oral)   18 97.8 °F (36.6 °C) (Oral)   18 98.4 °F (36.9 °C) (Oral)     BP Readings from Last 3 Encounters:   18 114/65   18 148/82   18 139/64     Pulse Readings from Last 3 Encounters:   18 84   18 71   18 80     Vitals:    18 1138   BP: 114/65   Pulse: 84   Resp: 18   Temp: 97.9 °F (36.6 °C)   TempSrc: Oral   SpO2: 93%   Height: 5' 1\" (1.549 m)         Learning Assessment:  :     Learning Assessment 2015   PRIMARY LEARNER Patient Patient   HIGHEST LEVEL OF EDUCATION - PRIMARY LEARNER  SOME COLLEGE SOME COLLEGE   BARRIERS PRIMARY LEARNER OTHER NONE   CO-LEARNER CAREGIVER - No   CO-LEARNER NAME - n/a   PRIMARY LANGUAGE ENGLISH ENGLISH   LEARNER PREFERENCE PRIMARY LISTENING LISTENING     VIDEOS -   ANSWERED BY patient patient   RELATIONSHIP SELF SELF       Depression Screening:  :     PHQ over the last two weeks 8/3/2017   Little interest or pleasure in doing things Not at all   Feeling down, depressed, irritable, or hopeless Not at all   Total Score PHQ 2 0       Fall Risk Assessment:  :     Fall Risk Assessment, last 12 mths 6/21/2018   Able to walk? No       Abuse Screening:  :     Abuse Screening Questionnaire 7/11/2018 8/3/2017   Do you ever feel afraid of your partner? N N   Are you in a relationship with someone who physically or mentally threatens you? N N   Is it safe for you to go home? Y Y       ADL Screening:  :     ADL Assessment 8/3/2017   Feeding yourself No Help Needed   Getting from bed to chair Help Needed   Getting dressed Help Needed   Bathing or showering Help Needed   Walk across the room (includes cane/walker) Help Needed   Using the telphone No Help Needed   Taking your medications Help Needed   Preparing meals Help Needed   Managing money (expenses/bills) No Help Needed   Moderately strenuous housework (laundry) Help Needed   Shopping for personal items (toiletries/medicines) Help Needed   Shopping for groceries Help Needed   Driving Help Needed   Climbing a flight of stairs Help Needed   Getting to places beyond walking distances Help Needed                 Medication reconciliation up to date and corrected with patient at this time. Advance Care plan is on file. no chest pain, no cough, and no shortness of breath.

## 2019-09-04 NOTE — PROVIDER CONTACT NOTE (CRITICAL VALUE NOTIFICATION) - SITUATION
Individual Follow-Up Form    9/4/2019    Quit Date: TBD    Clinical Status of Patient: Outpatient    Length of Service: 45 minutes    Continuing Medication: yes  Wellbutrin    Other Medications: 21 mg nicotine patch     Target Symptoms: Withdrawal and medication side effects. The following were  rated moderate (3) to severe (4) on TCRS:  · Moderate (3): none  · Severe (4): none    Comments: The patient is smoking 15 cigarettes/day  from 22 cigarettes/day.The CO measurement = 30   ppm which indicates possibly a heavy smoker. The patient is using 150 mg Wellbutrin SR BID and 21 mg nicotine patches. She is slowly making progress. She is working on time delay and is not going out with her  to smoke. She is determained to quit. Discussed how to use delay and to concentrate on one area to quit. The patient denies any abnormal behavioral or mental changes at this time.The patient will continue individual sessions and medication monitoring by CTTS. Prescribed medication management will be by practitoner.        Diagnosis: F17.210    Next Visit: 2 weeks   PT HAS A VANCO TROUGH OF 30.0 AND A MAG LEVEL OF 1.0

## 2019-09-23 NOTE — CONSULT NOTE ADULT - ATTENDING COMMENTS
[id number] This serves as the H&P for this patient as this document was erroneously placed as a "consultation note".  I discussed the care of this patient last night  at 2100 in addition to multiple visits this AM, discussion with  at bedside, and changed goals of care.  In short, she was admitted with septic shock, gram negative jin bacteremia with source most likely being L pyelonephritis.  Aggressive volume resuscitation took place, lactates trended, antibiotics started, norepinephrine, vasopressin infusions started, bicarbonate infusion, and the new Vitamin C protocol as published by Sharla 2 months ago in Chest Medicine.  Unfortunately, her lactemia never resolved.  In my critical care ultrasound, I found dramatic B lines bilaterally indicative of volume overload in the presence of a heart with adequate ejection fraction at that time -- I discontinued plasmalyte and decreased the bicarbonate infusion to 40mL/hr with the hope of attempting diuresis if able to decrease the vasoactive requirements.  However, in beginning to discuss the goals of care with the  through a , he clearly stated that he understood she is end of life -- at that time, she self-extubated, and our meeting was aborted and we ran into the room with the .  He opted not to reintubate, and rather to wean pressors and to initiate comfort measures.  Dilaudid was administered, and she maintained her comfort.  Family was at bedside,  and chaplains were called, and the patient did  later in the morning.  Critical care time excluding procedures: 85 minutes.

## 2022-04-04 NOTE — DISCHARGE NOTE ADULT - CARE PROVIDERS DIRECT ADDRESSES
Pt more lethargic this afternoon.  ABG's obtained and perfect served results to Dr. Reynoso.  Sleepy, but arousable to voice.  Put back on bipap with settings of 14/5, 40%.  
Pt's daughter called to talk w patient on the phone.  I spoke briefly with daughter and explained to her we were putting patient on a heparin drip because they found a blood clot in her mom's leg duringthe ultrasound and this medication would prevent more clots to form.  She said she would relay this information to the rest of the family.  
[facility] served Dr. Pang and made him aware that pt had [facility]  Waiting for him to respond from perfect serve.  
,DirectAddress_Unknown,DirectAddress_Unknown